# Patient Record
Sex: FEMALE | Race: WHITE | NOT HISPANIC OR LATINO | Employment: FULL TIME | URBAN - METROPOLITAN AREA
[De-identification: names, ages, dates, MRNs, and addresses within clinical notes are randomized per-mention and may not be internally consistent; named-entity substitution may affect disease eponyms.]

---

## 2017-09-19 DIAGNOSIS — F41.8 DEPRESSION WITH ANXIETY: ICD-10-CM

## 2017-09-19 NOTE — TELEPHONE ENCOUNTER
Request for medication refill:    Date of last visit at clinic: 1/18/16    Please complete refill if appropriate and CLOSE ENCOUNTER.    Closing the encounter signifies the refill is complete.    If refill has been denied, please complete the smart phrase .smirefuse and route it to the [facility] RN TRIAGE pool to inform the patient and the pharmacy.    Ingrid Gates RN

## 2017-10-11 ENCOUNTER — OFFICE VISIT (OUTPATIENT)
Dept: FAMILY MEDICINE | Facility: CLINIC | Age: 47
End: 2017-10-11

## 2017-10-11 VITALS
HEART RATE: 72 BPM | BODY MASS INDEX: 32.67 KG/M2 | RESPIRATION RATE: 16 BRPM | WEIGHT: 228.2 LBS | SYSTOLIC BLOOD PRESSURE: 127 MMHG | HEIGHT: 70 IN | TEMPERATURE: 98.9 F | DIASTOLIC BLOOD PRESSURE: 84 MMHG | OXYGEN SATURATION: 98 %

## 2017-10-11 DIAGNOSIS — F17.210 CIGARETTE NICOTINE DEPENDENCE WITHOUT COMPLICATION: ICD-10-CM

## 2017-10-11 DIAGNOSIS — Z00.00 ROUTINE HEALTH MAINTENANCE: Primary | ICD-10-CM

## 2017-10-11 DIAGNOSIS — F41.8 DEPRESSION WITH ANXIETY: ICD-10-CM

## 2017-10-11 ASSESSMENT — ANXIETY QUESTIONNAIRES
1. FEELING NERVOUS, ANXIOUS, OR ON EDGE: SEVERAL DAYS
IF YOU CHECKED OFF ANY PROBLEMS ON THIS QUESTIONNAIRE, HOW DIFFICULT HAVE THESE PROBLEMS MADE IT FOR YOU TO DO YOUR WORK, TAKE CARE OF THINGS AT HOME, OR GET ALONG WITH OTHER PEOPLE: NOT DIFFICULT AT ALL
5. BEING SO RESTLESS THAT IT IS HARD TO SIT STILL: NOT AT ALL
2. NOT BEING ABLE TO STOP OR CONTROL WORRYING: SEVERAL DAYS
7. FEELING AFRAID AS IF SOMETHING AWFUL MIGHT HAPPEN: SEVERAL DAYS
GAD7 TOTAL SCORE: 3
6. BECOMING EASILY ANNOYED OR IRRITABLE: NOT AT ALL
3. WORRYING TOO MUCH ABOUT DIFFERENT THINGS: NOT AT ALL

## 2017-10-11 ASSESSMENT — PATIENT HEALTH QUESTIONNAIRE - PHQ9
5. POOR APPETITE OR OVEREATING: NOT AT ALL
SUM OF ALL RESPONSES TO PHQ QUESTIONS 1-9: 1

## 2017-10-11 NOTE — PATIENT INSTRUCTIONS
Here is the plan from today's visit    1. Routine health maintenance  Tdap today  - ADMIN VACCINE, INITIAL  - TDAP VACCINE (BOOSTRIX)    2. Depression with anxiety  Continue your zoloft at current dose  Start Vit D again for the winter    You are up to date with all health maintenance - reminder on pap next year    Tobacco use - work with your therapist on some strategies for stress reduction  - can start with the Step 2 patches (14mg)  - pick a quit date!!! (and see what happens with your mom - tell her I said it was a good idea)      Please call or return to clinic if your symptoms don't go away.    Follow up plan - as needed    Thank you for coming to [facility] today.  Lab Testing:  **If you had lab testing today and your results are reassuring or normal they will be mailed to you or sent through [employer] within 7 days.   **If the lab tests need quick action we will call you with the results.  The phone number we will call with results is # [phone] (home) . If this is not the best number please call our clinic and change the number.  Medication Refills:  If you need any refills please call your pharmacy and they will contact us.   If you need to  your refill at a new pharmacy, please contact the new pharmacy directly. The new pharmacy will help you get your medications transferred faster.   Scheduling:  If you have any concerns about today's visit or wish to schedule another appointment please call our office during normal business hours [phone] (8-5:00 M-F)  If a referral was made to a [facility] and you don't get a call from central scheduling please call [phone].  If a Mammogram was ordered for you at [facility] call [phone] to schedule or change your appointment.  If you had an XRay/CT/Ultrasound/MRI ordered the number is [phone] to schedule or change your radiology appointment.   Medical Concerns:  If you have urgent medical concerns please  call [phone] at any time of the day.  If you have a medical emergency please call 555.

## 2017-10-11 NOTE — MR AVS SNAPSHOT
After Visit Summary   10/11/2017    Pavithra Aguirre    MRN: [id number]           Patient Information     Date Of Birth          1970        Visit Information        Provider Department      10/11/2017 8:00 AM Tracey Guidry MD [facility]        Today's Diagnoses     Routine health maintenance    -  1    Depression with anxiety          Care Instructions    Here is the plan from today's visit    1. Routine health maintenance  Tdap today  - ADMIN VACCINE, INITIAL  - TDAP VACCINE (BOOSTRIX)    2. Depression with anxiety  Continue your zoloft at current dose  Start Vit D again for the winter    You are up to date with all health maintenance - reminder on pap next year    Tobacco use - work with your therapist on some strategies for stress reduction  - can start with the Step 2 patches (14mg)  - pick a quit date!!! (and see what happens with your mom - tell her I said it was a good idea)      Please call or return to clinic if your symptoms don't go away.    Follow up plan - as needed    Thank you for coming to [facility] today.  Lab Testing:  **If you had lab testing today and your results are reassuring or normal they will be mailed to you or sent through [employer] within 7 days.   **If the lab tests need quick action we will call you with the results.  The phone number we will call with results is # [phone] (home) . If this is not the best number please call our clinic and change the number.  Medication Refills:  If you need any refills please call your pharmacy and they will contact us.   If you need to  your refill at a new pharmacy, please contact the new pharmacy directly. The new pharmacy will help you get your medications transferred faster.   Scheduling:  If you have any concerns about today's visit or wish to schedule another appointment please call our office during normal business hours [phone] (8-5:00 M-F)  If a referral was made to a [facility] and you don't get a call from central scheduling please call [phone].  If a Mammogram was ordered for you at [facility] call [phone] to schedule or change your appointment.  If you had an XRay/CT/Ultrasound/MRI ordered the number is [phone] to schedule or change your radiology appointment.   Medical Concerns:  If you have urgent medical concerns please call [phone] at any time of the day.  If you have a medical emergency please call [phone].            Follow-ups after your visit        Who to contact     Please call your clinic at [phone] to:    Ask questions about your health    Make or cancel appointments    Discuss your medicines    Learn about your test results    Speak to your doctor   If you have compliments or concerns about an experience at your clinic, or if you wish to file a complaint, please contact [facility] Patient Relations at [phone] or email us at [email or web address]         Additional Information About Your Visit        [employer] Information     [employer] gives you secure access to your electronic health record. If you see a primary care provider, you can also send messages to your care team and make appointments. If you have questions, please call your primary care clinic.  If you do not have a primary care provider, please call [phone] and they will assist you.      [employer] is an electronic gateway that provides easy, online access to your medical records. With [employer], you can request a clinic appointment, read your test results, renew a prescription or communicate with your care team.     To access your existing account, please contact your [facility] or call [phone] for assistance.        Care EveryWhere ID     This is your Care EveryWhere ID. This could be used by other organizations to access your [place] medical records  [id number]        Your Vitals Were  "    Pulse Temperature Respirations Height Pulse Oximetry Breastfeeding?    72 98.9  F (37.2  C) (Oral) 16 5' 10\" (177.8 cm) 98% No    BMI (Body Mass Index)                   32.74 kg/m2            Blood Pressure from Last 3 Encounters:   10/11/17 127/84   01/18/16 108/67   10/20/15 117/83    Weight from Last 3 Encounters:   10/11/17 228 lb 3.2 oz (103.5 kg)   01/18/16 248 lb 12.8 oz (112.9 kg)   10/20/15 241 lb 3.2 oz (109.4 kg)              We Performed the Following     ADMIN VACCINE, INITIAL     TDAP VACCINE (BOOSTRIX)          Today's Medication Changes          These changes are accurate as of: 10/11/17  8:43 AM.  If you have any questions, ask your nurse or doctor.               Stop taking these medicines if you haven't already. Please contact your care team if you have questions.     hydrOXYzine 25 MG tablet   Commonly known as:  ATARAX   Stopped by:  Tracey Guidry MD                    Primary Care Provider Office Phone # Fax #    Tracey Guidry [phone]       [address] [address]        Equal Access to Services     CLAUDIO HERNANDEZ : Albert Reyes, deyanirada anne, qanareshta kaalmada leti, say cabral. So [facility] [phone].    ATENCIÓN: Si habla español, tiene a anna disposición servicios gratuitos de asistencia lingüística. Llame al [phone].    We comply with applicable federal civil rights laws and [place] laws. We do not discriminate on the basis of race, color, national origin, age, disability, sex, sexual orientation, or gender identity.            Thank you!     Thank you for choosing [facility]  for your care. Our goal is always to provide you with excellent care. Hearing back from our patients is one way we can continue to improve our services. Please take a few minutes to complete the written survey that you may receive in the mail after your visit with us. Thank you!      "        Your Updated Medication List - Protect others around you: Learn how to safely use, store and throw away your medicines at www.disposemymeds.org.          This list is accurate as of: 10/11/17  8:43 AM.  Always use your most recent med list.                   Brand Name Dispense Instructions for use Diagnosis    sertraline 50 MG tablet    ZOLOFT    90 tablet    Take 1 tablet (50 mg) by mouth daily    Depression with anxiety

## 2017-10-11 NOTE — PROGRESS NOTES
"      HPI:       Pavithra Aguirre is a 47 year old who presents for the following  Patient presents with:  RECHECK: F/U on Zoloft medication       Depression/Anxiety follow up       Status since last visit: No change    What is going well? Buying a house, Got Kittens    Life Stressors:The Move, Money    Other associated symptoms :None    How is your sleep? Having issues falling asleep due to over thinking due to the move. Once sleeping does sleep well    New Significant life event:Yes-  Buying my 1st home [place]in [email or web address] exciting but a little stressful    Current substance abuse: None    Anxiety / Panic symptoms: Yes-  Only due to move.     Recent PHQ-9 Scores:     PHQ-9 SCORE 10/19/2015 1/18/2016 10/11/2017   Total Score - - -   Total Score 2 5 1     Recent PRISCA-7 Scores:      PRISCA-7 SCORE 4/3/2015 1/18/2016 10/11/2017   Total Score 20 - -   Total Score - 1 3       Today' sPHQ 9 Reviewed and it is 1 improving       Adherence and Exercise  Medication side effects: no  How often is a medication missed? Never  Exercise:walking  and biking 4-5 days/week for an average of 15-30 minutes     Still smoking - about 1/2 ppd  - uses a \"stress-reduction\"  - wants to quit, feels like she'll be ready soon  - has previously quit and/or cut down, used OTC patches  - mom smokes, too (trying to get her to quit, so they can work on it together)  - likely will try by New Year's if not sooner (feels like she needs to get through the move first)    No other medical concerns, UTD with HCM    Problem, Medication and Allergy Lists were reviewed and are current.     Patient Active Problem List    Diagnosis Date Noted     Depression with anxiety 01/18/2016     Priority: Medium     Generalized anxiety disorder 02/07/2015     Priority: Medium     Elevated blood pressure 02/07/2015     Priority: Medium     Patient is an established patient of this clinic and I reviewed/updated PMH, SocHx         Review of Systems:   Review of Systems " "  Constitutional: Negative for activity change, appetite change, fatigue, fever and unexpected weight change.   Respiratory: Negative.    Cardiovascular: Negative.    Musculoskeletal: Negative.    Neurological: Negative for dizziness and headaches.   Psychiatric/Behavioral: Negative for dysphoric mood, self-injury, sleep disturbance and suicidal ideas. The patient is nervous/anxious (slightly - but feels normal).              Physical Exam:   Patient Vitals for the past 24 hrs:   BP Temp Temp src Pulse Resp SpO2 Height Weight   10/11/17 0802 127/84 98.9  F (37.2  C) Oral 72 16 98 % 5' 10\" (177.8 cm) 228 lb 3.2 oz (103.5 kg)     Body mass index is 32.74 kg/(m^2).  Vitals were reviewed and were normal    Physical Exam   Constitutional: She is oriented to person, place, and time. She appears well-developed.   Eyes: EOM are normal.   Pulmonary/Chest: Effort normal.   Musculoskeletal: Normal range of motion.   Neurological: She is alert and oriented to person, place, and time.   Psychiatric: She has a normal mood and affect. Her behavior is normal. Judgment and thought content normal.         Results:       Assessment and Plan     Patient Instructions   Here is the plan from today's visit    1. Routine health maintenance  Tdap today  - ADMIN VACCINE, INITIAL  - TDAP VACCINE (BOOSTRIX)    2. Depression with anxiety  Continue your zoloft at current dose  Start Vit D again for the winter    You are up to date with all health maintenance - reminder on pap next year    Tobacco use - work with your therapist on some strategies for stress reduction  - can start with the Step 2 patches (14mg)  - pick a quit date!!! (and see what happens with your mom - tell her I said it was a good idea)  - smoking cessation counseling provided (5 minutes)      Please call or return to clinic if your symptoms don't go away.    Follow up plan - as needed    There are no discontinued medications.  Options for treatment and follow-up care were " reviewed with the patient. Pavithra Aguirre  engaged in the decision making process and verbalized understanding of the options discussed and agreed with the final plan.    Tracey Guidry MD

## 2017-10-12 PROBLEM — F17.210 CIGARETTE NICOTINE DEPENDENCE WITHOUT COMPLICATION: Status: ACTIVE | Noted: 2017-10-12

## 2017-10-12 ASSESSMENT — ENCOUNTER SYMPTOMS
MUSCULOSKELETAL NEGATIVE: 1
ACTIVITY CHANGE: 0
FEVER: 0
CARDIOVASCULAR NEGATIVE: 1
HEADACHES: 0
SLEEP DISTURBANCE: 0
FATIGUE: 0
RESPIRATORY NEGATIVE: 1
UNEXPECTED WEIGHT CHANGE: 0
DIZZINESS: 0
DYSPHORIC MOOD: 0
NERVOUS/ANXIOUS: 1
APPETITE CHANGE: 0

## 2017-10-12 ASSESSMENT — ANXIETY QUESTIONNAIRES: GAD7 TOTAL SCORE: 3

## 2018-01-16 ENCOUNTER — MYC MEDICAL ADVICE (OUTPATIENT)
Dept: FAMILY MEDICINE | Facility: CLINIC | Age: 48
End: 2018-01-16

## 2018-01-16 NOTE — TELEPHONE ENCOUNTER
RN called patient to ask more questions about right shoulder. Had to leave  with name and callback number    Ingrid Gates RN

## 2018-01-16 NOTE — TELEPHONE ENCOUNTER
RN called pt.     Pt stated during the fall she jerked her arm behind her as she held on the the railing. Pt states she can't see any bruising or swelling on right shoulder. Pt states she can't lift right arm above her shoulder or behind her. States it is tender and warm to touch. Pt has been immobilizing it as much as possible and stepan and anthony     RN did recommend being evaluated in clinic. Appt scheduled for 1/17 with Dr. Delvalle at 4pm    Ingrid Gates RN

## 2018-01-17 ENCOUNTER — OFFICE VISIT (OUTPATIENT)
Dept: FAMILY MEDICINE | Facility: CLINIC | Age: 48
End: 2018-01-17
Payer: COMMERCIAL

## 2018-01-17 VITALS
HEIGHT: 70 IN | WEIGHT: 240.8 LBS | HEART RATE: 82 BPM | BODY MASS INDEX: 34.47 KG/M2 | RESPIRATION RATE: 16 BRPM | DIASTOLIC BLOOD PRESSURE: 84 MMHG | OXYGEN SATURATION: 99 % | SYSTOLIC BLOOD PRESSURE: 130 MMHG | TEMPERATURE: 98.7 F

## 2018-01-17 DIAGNOSIS — S46.819A STRAIN OF DELTOID MUSCLE, INITIAL ENCOUNTER: Primary | ICD-10-CM

## 2018-01-17 NOTE — PROGRESS NOTES
"      HPI:       Pavithra Aguirre is a 47 year old who presents for the following  Patient presents with:  Pain: Hurt right shoulder in fall on sat.    Joint/Muscle Pain      Onset: Saturday evening  Injury?  Yes Details: Slipped while walking downstairs        Description:   Location(s): R shoulder  Character: Dull ache    Intensity: severe yesterday, slightly improved today    Progression of Symptoms: better    Accompanying Signs & Symptoms:  Other symptoms: none    History:   Previous similar pain: no      Worsened by    Overuse?: no  Morning Stiffness?:no    Alleviating factors:  Improved by: rest/inactivity, heat and massage  Therapies Tried and outcome: tried topical pain relievers with some help       Problem, Medication and Allergy Lists were reviewed and are current.  Patient is an established patient of this clinic.         Review of Systems:   Review of Systems   Musculoskeletal: Positive for myalgias.             Physical Exam:     Patient Vitals for the past 24 hrs:   BP Temp Temp src Pulse Resp SpO2 Height Weight   01/17/18 1611 130/84 98.7  F (37.1  C) Oral 82 16 99 % 5' 10\" (177.8 cm) 240 lb 12.8 oz (109.2 kg)     Body mass index is 34.55 kg/(m^2).  Vitals were reviewed and were normal     Physical Exam   Constitutional: She is oriented to person, place, and time. She appears well-developed and well-nourished.   HENT:   Head: Normocephalic and atraumatic.   Pulmonary/Chest: Effort normal.   Musculoskeletal:   Active abduction of R shoulder limited to 30 degrees by pain. Full internal rotation, external rotation and forward flexion. No tenderness to palpation of shoulder joint. No weakness appreciated. Negative lift off test.    Neurological: She is alert and oriented to person, place, and time.         Results:      Results from the last 24 hoursNo results found for this or any previous visit (from the past 24 hour(s)).  Assessment and Plan     Pavithra was seen today for pain.    Diagnoses and all " orders for this visit:    Strain of deltoid muscle, initial encounter  Discussed symptomatic treatment at home. Continue ice/heat/NSAIDs. Demonstrated simple ROM exercises to do at home while healing and encouraged movement that she can tolerate without pain (to stop if this worsens pain). RTC if not improved in 2 weeks. Patient agrees.     There are no discontinued medications.  Options for treatment and follow-up care were reviewed with the patient. Pavithra Aguirre  engaged in the decision making process and verbalized understanding of the options discussed and agreed with the final plan.    Lorrie Delvalle, DO

## 2018-01-17 NOTE — NURSING NOTE
I have recommended that this patient have a pap smear but she declines at this time.Beulah Pacheco, CMA

## 2018-01-18 ASSESSMENT — ENCOUNTER SYMPTOMS: MYALGIAS: 1

## 2018-08-30 ENCOUNTER — OFFICE VISIT (OUTPATIENT)
Dept: FAMILY MEDICINE | Facility: CLINIC | Age: 48
End: 2018-08-30
Payer: COMMERCIAL

## 2018-08-30 VITALS
OXYGEN SATURATION: 98 % | TEMPERATURE: 98.3 F | DIASTOLIC BLOOD PRESSURE: 84 MMHG | RESPIRATION RATE: 18 BRPM | HEART RATE: 76 BPM | SYSTOLIC BLOOD PRESSURE: 114 MMHG | BODY MASS INDEX: 36.1 KG/M2 | WEIGHT: 251.6 LBS

## 2018-08-30 DIAGNOSIS — F41.8 DEPRESSION WITH ANXIETY: Primary | ICD-10-CM

## 2018-08-30 RX ORDER — BUPROPION HYDROCHLORIDE 100 MG/1
TABLET, EXTENDED RELEASE ORAL
Qty: 60 TABLET | Refills: 2 | Status: SHIPPED | OUTPATIENT
Start: 2018-08-30 | End: 2018-11-21

## 2018-08-30 ASSESSMENT — ANXIETY QUESTIONNAIRES
3. WORRYING TOO MUCH ABOUT DIFFERENT THINGS: NOT AT ALL
6. BECOMING EASILY ANNOYED OR IRRITABLE: NEARLY EVERY DAY
7. FEELING AFRAID AS IF SOMETHING AWFUL MIGHT HAPPEN: MORE THAN HALF THE DAYS
IF YOU CHECKED OFF ANY PROBLEMS ON THIS QUESTIONNAIRE, HOW DIFFICULT HAVE THESE PROBLEMS MADE IT FOR YOU TO DO YOUR WORK, TAKE CARE OF THINGS AT HOME, OR GET ALONG WITH OTHER PEOPLE: SOMEWHAT DIFFICULT
5. BEING SO RESTLESS THAT IT IS HARD TO SIT STILL: NOT AT ALL
1. FEELING NERVOUS, ANXIOUS, OR ON EDGE: NOT AT ALL
2. NOT BEING ABLE TO STOP OR CONTROL WORRYING: SEVERAL DAYS
GAD7 TOTAL SCORE: 6

## 2018-08-30 ASSESSMENT — ENCOUNTER SYMPTOMS
GASTROINTESTINAL NEGATIVE: 1
APPETITE CHANGE: 0
CARDIOVASCULAR NEGATIVE: 1
MUSCULOSKELETAL NEGATIVE: 1
FATIGUE: 1
ACTIVITY CHANGE: 0
RESPIRATORY NEGATIVE: 1

## 2018-08-30 ASSESSMENT — PATIENT HEALTH QUESTIONNAIRE - PHQ9: 5. POOR APPETITE OR OVEREATING: NOT AT ALL

## 2018-08-30 NOTE — MR AVS SNAPSHOT
After Visit Summary   8/30/2018    Pavithra Aguirre    MRN: [id number]           Patient Information     Date Of Birth          1970        Visit Information        Provider Department      8/30/2018 9:40 AM Tracey Guidry MD [facility]        Today's Diagnoses     Depression with anxiety    -  1      Care Instructions    Here is the plan from today's visit    1. Depression with anxiety  Gentle taper  - buPROPion (WELLBUTRIN SR) 100 MG 12 hr tablet; Take by mouth once daily for 2 weeks, then twice daily  Dispense: 60 tablet; Refill: 2      Please call or return to clinic if your symptoms don't go away.    Follow up plan - by [employer] if needed, in one month in person to follow up    Thank you for coming to [facility] today.  Lab Testing:  **If you had lab testing today and your results are reassuring or normal they will be mailed to you or sent through [employer] within 7 days.   **If the lab tests need quick action we will call you with the results.  The phone number we will call with results is # [phone] (home) . If this is not the best number please call our clinic and change the number.  Medication Refills:  If you need any refills please call your pharmacy and they will contact us.   If you need to  your refill at a new pharmacy, please contact the new pharmacy directly. The new pharmacy will help you get your medications transferred faster.   Scheduling:  If you have any concerns about today's visit or wish to schedule another appointment please call our office during normal business hours [phone] (8-5:00 M-F)  If a referral was made to a [facility] and you don't get a call from central scheduling please call [phone].  If a Mammogram was ordered for you at [facility] call [phone] to schedule or change your appointment.  If you had an XRay/CT/Ultrasound/MRI ordered the number is [phone] to schedule or change  your radiology appointment.   Medical Concerns:  If you have urgent medical concerns please call [phone] at any time of the day.  If you have a medical emergency please call 911.            Follow-ups after your visit        Who to contact     Please call your clinic at [phone] to:    Ask questions about your health    Make or cancel appointments    Discuss your medicines    Learn about your test results    Speak to your doctor            Additional Information About Your Visit        [employer] Information     [employer] gives you secure access to your electronic health record. If you see a primary care provider, you can also send messages to your care team and make appointments. If you have questions, please call your primary care clinic.  If you do not have a primary care provider, please call [phone] and they will assist you.      [employer] is an electronic gateway that provides easy, online access to your medical records. With [employer], you can request a clinic appointment, read your test results, renew a prescription or communicate with your care team.     To access your existing account, please contact your [facility] or call [phone] for assistance.        Care EveryWhere ID     This is your Care EveryWhere ID. This could be used by other organizations to access your [place] medical records  [id number]        Your Vitals Were     Pulse Temperature Respirations Pulse Oximetry Breastfeeding? BMI (Body Mass Index)    76 98.3  F (36.8  C) (Oral) 18 98% No 36.1 kg/m2       Blood Pressure from Last 3 Encounters:   08/30/18 114/84   01/17/18 130/84   10/11/17 127/84    Weight from Last 3 Encounters:   08/30/18 251 lb 9.6 oz (114.1 kg)   01/17/18 240 lb 12.8 oz (109.2 kg)   10/11/17 228 lb 3.2 oz (103.5 kg)              Today, you had the following     No orders found for display         Today's Medication Changes          These changes are accurate as of 8/30/18  5:12 PM.   If you have any questions, ask your nurse or doctor.               Start taking these medicines.        Dose/Directions    buPROPion 100 MG 12 hr tablet   Commonly known as:  WELLBUTRIN SR   Used for:  Depression with anxiety   Started by:  Tracey Guidry MD        Take by mouth once daily for 2 weeks, then twice daily   Quantity:  60 tablet   Refills:  2            Where to get your medicines      These medications were sent to [facility] [id number] [place], [place] [address] [facility] [address] [address]     Phone:  [phone]     buPROPion 100 MG 12 hr tablet                Primary Care Provider Office Phone # Fax #    Tracey Guidry [phone]       [address] [address]        Equal Access to Services     JOVANNA HERNANDEZ : Albert ricardoo Sojazmyne, waaxda luqadaha, qaybta kaalmada adeegyada, say dickerson . So [facility] [place] [phone].    ATENCIÓN: Si habla español, tiene a anna disposición servicios gratuitos de asistencia lingüística. [facility] [phone].    We comply with applicable federal civil rights laws and [place] laws. We do not discriminate on the basis of race, color, national origin, age, disability, sex, sexual orientation, or gender identity.            Thank you!     Thank you for choosing [facility]  for your care. Our goal is always to provide you with excellent care. Hearing back from our patients is one way we can continue to improve our services. Please take a few minutes to complete the written survey that you may receive in the mail after your visit with us. Thank you!             Your Updated Medication List - Protect others around you: Learn how to safely use, store and throw away your medicines at www.disposemymeds.org.          This list is accurate as of 8/30/18  5:12 PM.  Always use your most recent med list.                   Brand Name  Dispense Instructions for use Diagnosis    buPROPion 100 MG 12 hr tablet    WELLBUTRIN SR    60 tablet    Take by mouth once daily for 2 weeks, then twice daily    Depression with anxiety       sertraline 50 MG tablet    ZOLOFT    90 tablet    Take 1 tablet (50 mg) by mouth daily    Depression with anxiety

## 2018-08-30 NOTE — PATIENT INSTRUCTIONS
Here is the plan from today's visit    1. Depression with anxiety  Gentle taper  - buPROPion (WELLBUTRIN SR) 100 MG 12 hr tablet; Take by mouth once daily for 2 weeks, then twice daily  Dispense: 60 tablet; Refill: 2      Please call or return to clinic if your symptoms don't go away.    Follow up plan - by [facility] if needed, in one month in person to follow up    Thank you for coming to [facility] today.  Lab Testing:  **If you had lab testing today and your results are reassuring or normal they will be mailed to you or sent through [employer] within 7 days.   **If the lab tests need quick action we will call you with the results.  The phone number we will call with results is # [phone] (home) . If this is not the best number please call our clinic and change the number.  Medication Refills:  If you need any refills please call your pharmacy and they will contact us.   If you need to  your refill at a new pharmacy, please contact the new pharmacy directly. The new pharmacy will help you get your medications transferred faster.   Scheduling:  If you have any concerns about today's visit or wish to schedule another appointment please call our office during normal business hours [phone] (8-5:00 M-F)  If a referral was made to a [facility] and you don't get a call from central scheduling please call [phone].  If a Mammogram was ordered for you at [facility] call [phone] to schedule or change your appointment.  If you had an XRay/CT/Ultrasound/MRI ordered the number is [phone] to schedule or change your radiology appointment.   Medical Concerns:  If you have urgent medical concerns please call [phone] at any time of the day.  If you have a medical emergency please call [phone].

## 2018-08-30 NOTE — PROGRESS NOTES
"       HPI       Pavithra Aguirre is a 48 year old  who presents for   Chief Complaint   Patient presents with     Depression     anger, several days, increased           Depression/Anxiety follow up      Your mood since your last visit: Worsened, increased anger    Medication? Zoloft     Therapy? Yes, Dr Beatty    Exercise? Inconsistent    What is going well? Blanket, gardening     Life Stressors:Money    Other associated symptoms :Increased sleeping    How is your sleep? Good, but also just feels so fatigued all the time    New significant life event: Yes -  Lay offs at work     Current substance use:  Cannabis    Anxiety / Panic symptoms: No    Quit smoking last Jan, which is great, but that has taken away her \"stress relief\" and is also contributing to wt gain  Hormonal, likely perimenopausal, feels \"fat, bloated, and irritable\"  Knows she needs to exercise more, feels better when she does that    Moved in to a new house last fall  - stressors of home ownership  - lots of work this summer    Has side effects from last attempt at increasing the zoloft (constipation, irritability)     Recent PHQ-9 Scores:     PHQ-9 SCORE 1/18/2016 10/11/2017 8/30/2018   Total Score - - -   Total Score 5 1 8     Recent PRISCA-7 Scores:      PRISCA-7 SCORE 1/18/2016 10/11/2017 8/30/2018   Total Score - - -   Total Score 1 3 6     +++++++    Problem, Medication and Allergy Lists were reviewed and updated if needed..    Patient is an established patient of this clinic..         Review of Systems:   Review of Systems   Constitutional: Positive for fatigue. Negative for activity change and appetite change.   Respiratory: Negative.    Cardiovascular: Negative.    Gastrointestinal: Negative.    Musculoskeletal: Negative.    Psychiatric/Behavioral: Negative for self-injury and suicidal ideas.        See HPI            Physical Exam:     Vitals:    08/30/18 1014   BP: 114/84   Pulse: 76   Resp: 18   Temp: 98.3  F (36.8  C)   TempSrc: Oral   SpO2: " 98%   Weight: 251 lb 9.6 oz (114.1 kg)     Body mass index is 36.1 kg/(m^2).  Vitals were reviewed and were normal     Physical Exam   Constitutional: She is oriented to person, place, and time. She appears well-developed and well-nourished.   Pulmonary/Chest: Effort normal.   Musculoskeletal: Normal range of motion. She exhibits no edema.   Neurological: She is alert and oriented to person, place, and time.     MENTAL STATUS EXAM  Appearance: appropriate  Attitude: cooperative  Behavior: normal  Eye Contact: normal  Speech: normal  Orientation: oriented to person, place, time and situation  Mood: generally ok, though more agitated recently  Affect: Congruent with mood  Thought Process: appropriate  Suicidal Ideation: reports no suicidal ideation  Hallucination: denies      Results:   No testing ordered today    Assessment and Plan        Patient Instructions   Here is the plan from today's visit    1. Depression with anxiety  Gentle taper up on wellbutrin  Close follow up on symptoms  Monitor for s/sx of increased anxiety  Consider dropping the zoloft depending on response with wellbutrin  Continue with therapy  Increase activity (set realistic goals)  - buPROPion (WELLBUTRIN SR) 100 MG 12 hr tablet; Take by mouth once daily for 2 weeks, then twice daily  Dispense: 60 tablet; Refill: 2      [facility], in one month in person to follow up         There are no discontinued medications.    Options for treatment and follow-up care were reviewed with the patient. Pavithra Aguirre  engaged in the decision making process and verbalized understanding of the options discussed and agreed with the final plan.    Tracey Guidry MD

## 2018-08-31 ASSESSMENT — PATIENT HEALTH QUESTIONNAIRE - PHQ9: SUM OF ALL RESPONSES TO PHQ QUESTIONS 1-9: 8

## 2018-08-31 ASSESSMENT — ANXIETY QUESTIONNAIRES: GAD7 TOTAL SCORE: 6

## 2018-10-10 ENCOUNTER — MYC REFILL (OUTPATIENT)
Dept: FAMILY MEDICINE | Facility: CLINIC | Age: 48
End: 2018-10-10

## 2018-10-10 DIAGNOSIS — F41.8 DEPRESSION WITH ANXIETY: ICD-10-CM

## 2018-10-10 NOTE — TELEPHONE ENCOUNTER
Message from [employer]:  Original authorizing provider: Tracey Guidry MD    Pavithra Aguirre would like a refill of the following medications:  sertraline (ZOLOFT) 50 MG tablet [Tracey Guidry MD]    Preferred pharmacy: [facility] [address] [place] [address] AT [address]    Comment:  I am out of refills and only have two pills left.

## 2018-10-10 NOTE — TELEPHONE ENCOUNTER

## 2018-10-15 DIAGNOSIS — F41.8 DEPRESSION WITH ANXIETY: ICD-10-CM

## 2018-10-15 NOTE — TELEPHONE ENCOUNTER

## 2018-11-21 DIAGNOSIS — F41.8 DEPRESSION WITH ANXIETY: ICD-10-CM

## 2018-11-22 RX ORDER — BUPROPION HYDROCHLORIDE 100 MG/1
TABLET, EXTENDED RELEASE ORAL
Qty: 60 TABLET | Refills: 2 | Status: SHIPPED | OUTPATIENT
Start: 2018-11-22 | End: 2019-02-13

## 2019-02-13 DIAGNOSIS — F41.8 DEPRESSION WITH ANXIETY: ICD-10-CM

## 2019-02-13 RX ORDER — BUPROPION HYDROCHLORIDE 100 MG/1
TABLET, EXTENDED RELEASE ORAL
Qty: 60 TABLET | Refills: 2 | Status: SHIPPED | OUTPATIENT
Start: 2019-02-13 | End: 2019-07-12

## 2019-02-13 NOTE — TELEPHONE ENCOUNTER

## 2019-02-14 ENCOUNTER — OFFICE VISIT (OUTPATIENT)
Dept: FAMILY MEDICINE | Facility: CLINIC | Age: 49
End: 2019-02-14
Payer: COMMERCIAL

## 2019-02-14 VITALS
BODY MASS INDEX: 35.87 KG/M2 | HEART RATE: 78 BPM | OXYGEN SATURATION: 98 % | DIASTOLIC BLOOD PRESSURE: 84 MMHG | SYSTOLIC BLOOD PRESSURE: 124 MMHG | TEMPERATURE: 98.7 F | WEIGHT: 250 LBS

## 2019-02-14 DIAGNOSIS — H69.93 DYSFUNCTION OF BOTH EUSTACHIAN TUBES: Primary | ICD-10-CM

## 2019-02-14 RX ORDER — FLUTICASONE PROPIONATE 50 MCG
2 SPRAY, SUSPENSION (ML) NASAL DAILY
Qty: 18.2 ML | Refills: 1 | Status: SHIPPED | OUTPATIENT
Start: 2019-02-14 | End: 2020-02-14

## 2019-02-14 NOTE — PATIENT INSTRUCTIONS
Here is the plan from today's visit    1. Dysfunction of both eustachian tubes  Continue with current plan of neti pot as often as you wish, follow neti pot with flonase (2 sprays each nostril, point towards ear)  Day of flying, use afrin if needed  Call our clinic ([phone]) if you have a fever (temp greater than 100.4F) and we may prescribe you an antibiotic  May use sudafed SPARINGLY if still having ear trouble day of flying  - fluticasone (FLONASE) 50 MCG/ACT nasal spray; Spray 2 sprays into both nostrils daily  Dispense: 18.2 mL; Refill: 1      Please call or return to clinic if your symptoms don't go away.    Follow up plan  Please make a clinic appointment for follow up with your primary physician Tracey Guidry MD in 2 weeks if not improved.    Thank you for coming to [facility] today.  Lab Testing:  **If you had lab testing today and your results are reassuring or normal they will be mailed to you or sent through [employer] within 7 days.   **If the lab tests need quick action we will call you with the results.  The phone number we will call with results is # [phone] (home) . If this is not the best number please call our clinic and change the number.  Medication Refills:  If you need any refills please call your pharmacy and they will contact us.   If you need to  your refill at a new pharmacy, please contact the new pharmacy directly. The new pharmacy will help you get your medications transferred faster.   Scheduling:  If you have any concerns about today's visit or wish to schedule another appointment please call our office during normal business hours [phone] (8-5:00 M-F)  If a referral was made to a [facility] and you don't get a call from central scheduling please call [phone].  If a Mammogram was ordered for you at [facility] call [phone] to schedule or change your appointment.  If you had an XRay/CT/Ultrasound/MRI ordered the number  is [phone] to schedule or change your radiology appointment.   Medical Concerns:  If you have urgent medical concerns please call [phone] at any time of the day.    Karley Monsalve, DO

## 2019-02-14 NOTE — PROGRESS NOTES
Preceptor Attestation:   Patient seen, evaluated and discussed with the resident. I have verified the content of the note, which accurately reflects my assessment of the patient and the plan of care.   Supervising Physician:  Emilie Larry MD

## 2019-02-14 NOTE — PROGRESS NOTES
"       HPI       Pavithra Aguirre is a 48 year old  who presents for   Chief Complaint   Patient presents with     Cough     coughing cleared up and still has sx of ear pain and runny nose     Acute Illness   Concerns: next Tuesday, less than 1 week ago  When did it start? 2 weeks  Is it getting better, worse or staying the same? Primarily in head and nose, then \"dropped to chest\" after 1 week, productive cough, nasal sputum. Started to feel better, then ear \"started to act strange.\" no sore throat any more, no cough, little bit of sinus drainage    Fatigue/Achiness?:No     Fever?: No     Chills/Sweats?: No     Headache (location?) YES slight frontal    Sinus Pressure?:No     Eye redness/Discharge?: No     Ear Pain?:  YES, right side pain and decreased hearing, left side decreased hearing only    Runny nose?:  YES, post nasal drip    Congestion?: No     Sore Throat?: No   Respiratory    Cough?: no     Wheeze?: No   GI/    Decreased Appetite?: No     Nausea?:No     Vomiting?: No     Diarrhea?:  No     Dysuria/Frequency?.:No       Any Illness Exposure?:  YES, co workers with colds    Any foreign travel or contact with anyone ill who travelled abroad? No     Therapies Tried and outcome: neti pot, emergen c daily, zinc/echinacea losange, afrin tried for a few days worked on nose  History of swimmer's ear - since as a kid  - usually do alcohol in ear and drain   - less swimming than usual   +++++++    Problem, Medication and Allergy Lists were reviewed and updated if needed..    Patient is an established patient of this clinic..         Review of Systems:   Review of Systems         Physical Exam:     Vitals:    02/14/19 1541   BP: 124/84   Pulse: 78   Temp: 98.7  F (37.1  C)   TempSrc: Oral   SpO2: 98%   Weight: 113.4 kg (250 lb)     Body mass index is 35.87 kg/m .  Vitals were reviewed and were normal     Physical Exam   Constitutional: She appears well-developed and well-nourished. No distress.   HENT:   Right Ear: " External ear normal.   Left Ear: External ear normal.   Mouth/Throat: Oropharynx is clear and moist. No oropharyngeal exudate.   Bilateral TM's normal   Eyes: Conjunctivae are normal. Pupils are equal, round, and reactive to light. Right eye exhibits no discharge. Left eye exhibits no discharge.   Cardiovascular: Normal rate and regular rhythm.   Pulmonary/Chest: Effort normal and breath sounds normal. No stridor. No respiratory distress. She has no wheezes. She has no rales.   Neurological: She is alert.   Skin: She is not diaphoretic.   Psychiatric: She has a normal mood and affect. Her behavior is normal. Judgment and thought content normal.   Nursing note and vitals reviewed.      Results:   No testing ordered today    Assessment and Plan        1. Dysfunction of both eustachian tubes  No signs of bacterial infection on exam. Pt's symptoms consistent with recent viral URI, and now with some nasal edema causing sensation of fullness in bilateral ears. Discussed with patient about symptomatic treatment - continuing nasal rinses, then using flonase to decrease inflammation in nasal turbinates causing blockage of the eustachian tubes. Also suggested afrin PRN x 3 days maximum, but pt declined wanting to use afrin. If patient develops a fever, instructed to call right away for antibiotics, but no signs of sinusitis today to suggest need of abx now.  - fluticasone (FLONASE) 50 MCG/ACT nasal spray; Spray 2 sprays into both nostrils daily  Dispense: 18.2 mL; Refill: 1       There are no discontinued medications.    Options for treatment and follow-up care were reviewed with the patient. Pavithra Aguirre  engaged in the decision making process and verbalized understanding of the options discussed and agreed with the final plan.    Karley Monsalve,

## 2019-07-11 DIAGNOSIS — F41.8 DEPRESSION WITH ANXIETY: ICD-10-CM

## 2019-07-11 NOTE — TELEPHONE ENCOUNTER
"Request for medication refill:buPROPion (WELLBUTRIN SR) 100 MG 12 hr tablet    Providers if patient needs an appointment and you are willing to give a one month supply please refill for one month and  send a letter/MyChart using \".SMILLIMITEDREFILL\" .smillimited and route chart to \"P [facility] \" (Giving one month refill in non controlled medications is strongly recommended before denial)    If refill has been denied, meaning absolutely no refills without visit, please complete the smart phrase \".smirxrefuse\" and route it to the \"P [facility] MED REFILLS\"  pool to inform the patient and the pharmacy.    Law Na, MA        "

## 2019-07-12 RX ORDER — BUPROPION HYDROCHLORIDE 100 MG/1
TABLET, EXTENDED RELEASE ORAL
Qty: 60 TABLET | Refills: 2 | Status: SHIPPED | OUTPATIENT
Start: 2019-07-12 | End: 2019-12-23

## 2019-08-08 ENCOUNTER — OFFICE VISIT (OUTPATIENT)
Dept: FAMILY MEDICINE | Facility: CLINIC | Age: 49
End: 2019-08-08
Payer: COMMERCIAL

## 2019-08-08 VITALS
HEIGHT: 70 IN | HEART RATE: 63 BPM | BODY MASS INDEX: 36.59 KG/M2 | OXYGEN SATURATION: 99 % | RESPIRATION RATE: 18 BRPM | WEIGHT: 255.6 LBS | SYSTOLIC BLOOD PRESSURE: 113 MMHG | TEMPERATURE: 98.8 F | DIASTOLIC BLOOD PRESSURE: 77 MMHG

## 2019-08-08 DIAGNOSIS — M22.2X2 PATELLOFEMORAL SYNDROME OF LEFT KNEE: Primary | ICD-10-CM

## 2019-08-08 DIAGNOSIS — M67.40 GANGLION CYST: ICD-10-CM

## 2019-08-08 ASSESSMENT — ENCOUNTER SYMPTOMS
CHILLS: 0
SHORTNESS OF BREATH: 0
FEVER: 0

## 2019-08-08 ASSESSMENT — PAIN SCALES - GENERAL: PAINLEVEL: SEVERE PAIN (6)

## 2019-08-08 ASSESSMENT — MIFFLIN-ST. JEOR: SCORE: 1865.89

## 2019-08-08 NOTE — PROGRESS NOTES
"       HPI       Pavithra Aguirre is a 49 year old with history of anxiety and depression who presents for   Chief Complaint   Patient presents with     Musculoskeletal Problem     patient is complaing of something is growing from right foot, causing pain and knee pain      Left Knee pain  No event that she remembers.   Swells with exercise and use. Worse in the AM. No event.   No catching or locking  History of right knee surgery for meniscus tear  Tried icing.     Right foot nodule:   Started/last noticed last week   Thinks it grew when she was on her menstrual cycle   Not painful now although it was painful Saturday when it was around \"half an inch\" larger than it is now. No rash. Not itching. Not on left foot. Never had this happen again. No history of ganglion cyst on wrists.     Mood is good today.     Problem, Medication and Allergy Lists were reviewed and updated if needed..    Patient is an established patient of this clinic.  Past Medical History:   Diagnosis Date     Depressive disorder      Fibroadenoma of left breast     s/p lumpectomy          Review of Systems:   Review of Systems   Constitutional: Negative for chills and fever.   Respiratory: Negative for shortness of breath.    Musculoskeletal: Arthralgias: knee pain.          Physical Exam:     Vitals:    08/08/19 1612   BP: 113/77   Pulse: 63   Resp: 18   Temp: 98.8  F (37.1  C)   TempSrc: Oral   SpO2: 99%   Weight: 115.9 kg (255 lb 9.6 oz)   Height: 1.78 m (5' 10.08\")     Body mass index is 36.59 kg/m .  Vitals were reviewed and were normal     Physical Exam   Constitutional: She is oriented to person, place, and time. She appears well-developed and well-nourished. No distress.   HENT:   Head: Normocephalic and atraumatic.   Eyes: Conjunctivae are normal. No scleral icterus.   Neck: Neck supple.   Cardiovascular: Intact distal pulses.   Pulmonary/Chest: Effort normal. No respiratory distress.   Musculoskeletal: Normal range of motion.        Right " knee: She exhibits swelling (very mild). She exhibits normal range of motion, no effusion, normal patellar mobility and no bony tenderness. No tenderness found.        Left knee: She exhibits effusion (mild) and abnormal meniscus (positive thessaly's ). She exhibits normal range of motion, no LCL laxity, normal patellar mobility, no bony tenderness and no MCL laxity. No tenderness found. No medial joint line, no lateral joint line, no MCL, no LCL and no patellar tendon tenderness noted.        Legs:       Feet:    Neurological: She is alert and oriented to person, place, and time.   Skin: Skin is warm and dry. She is not diaphoretic.   Psychiatric: She has a normal mood and affect. Her behavior is normal.   Vitals reviewed.    Right Knee Exam     Other   Effusion: no effusion present      Left Knee Exam     Muscle Strength   The patient has normal left knee strength.    Tenderness   The patient is experiencing no tenderness.     Range of Motion   The patient has normal left knee ROM.    Tests   Joellen:  Medial - negative Lateral - negative  Varus: negative Valgus: negative  Lachman:  Anterior - negative    Posterior - negative  Drawer:  Anterior - negative     Posterior - negative  Patellar apprehension: negative    Other   Erythema: absent  Scars: absent  Sensation: normal  Pulse: present  Swelling: mild  Effusion: effusion (mild) present    Comments:  Positive thessaly test with external rotation             Results:   No testing ordered today    Assessment and Plan    Pavithra was seen today for left knee pain along with right foot nodule. Foot nodule initial differential limited to infectious (doesn't appear infected), lipoma (not freely movable, not same consistency), cyst- possibly ganglion cyst as near navicular bone and is somewhat similar to wrist. Left knee pain differential includes PFS with mild swelling prepatellar bursa area as pain is worse with activity, prepatellar bursitis, patellar dislocation,  meniscal tear (does have positive thessaly), quadriceps tendinopathy.     Diagnoses and all orders for this visit:    Patellofemoral syndrome of left knee  Ibuprofen 1-2 tablets every 6 hours as needed for pain. Ice packs every 20 minutes on and 20 mins off as needed for pain and swelling. Physical therapy referral today. If pain continues with initial interventions, will need XR and possibly MRI to evaluate for other etiology. Follow up in 3-4 weeks preferably after physical therapy    -     [facility] REFERR[facility] - [facility]; Future    Ganglion cyst  Right foot nodule appears cystic in nature (nonmobile, non tender to palpation, not infectious appearing). Will advise patient watch the lesion (it is already smaller than it was earlier this week). Discussed various treatments including aspiration of cyst or surgery if becomes painful or troublesome.      There are no discontinued medications.    Options for treatment and follow-up care were reviewed with the patient. Pavithra Aguirre  engaged in the decision making process and verbalized understanding of the options discussed and agreed with the final plan.    Jermaine Crisostomo MD

## 2019-08-08 NOTE — PROGRESS NOTES
Preceptor Attestation:   Patient seen, evaluated and discussed with the resident. I have verified the content of the note, which accurately reflects my assessment of the patient and the plan of care.   Supervising Physician:  Cipriano Rutledge MD

## 2019-08-08 NOTE — PATIENT INSTRUCTIONS
Here is the plan from today's visit    1. Patellofemoral syndrome of left knee  Use ibuprofen 1-2 tablets every 6 hours as needed for pain. Ice packs every 20 minutes on and 20 mins off as needed for pain and swelling. Physical therapy is key.    - [facility] REFERRAL - [facility]; Future    Please call or return to clinic if your symptoms don't go away.    Thank you for coming to [facility] today.  Lab Testing:  **If you had lab testing today and your results are reassuring or normal they will be mailed to you or sent through [employer] within 7 days.   **If the lab tests need quick action we will call you with the results.  The phone number we will call with results is # [phone] (home) . If this is not the best number please call our clinic and change the number.  Medication Refills:  If you need any refills please call your pharmacy and they will contact us.   If you need to  your refill at a new pharmacy, please contact the new pharmacy directly. The new pharmacy will help you get your medications transferred faster.   Scheduling:  If you have any concerns about today's visit or wish to schedule another appointment please call our office during normal business hours [phone] (8-5:00 M-F)  If a referral was made to a [facility] and you don't get a call from central scheduling please call [phone].  If a Mammogram was ordered for you at [facility] call [phone] to schedule or change your appointment.  If you had an XRay/CT/Ultrasound/MRI ordered the number is [phone] to schedule or change your radiology appointment.   Medical Concerns:  If you have urgent medical concerns please call [phone] at any time of the day.    Jermaine Crisostomo MD

## 2019-08-12 ENCOUNTER — THERAPY VISIT (OUTPATIENT)
Dept: PHYSICAL THERAPY | Facility: CLINIC | Age: 49
End: 2019-08-12
Payer: COMMERCIAL

## 2019-08-12 DIAGNOSIS — M22.2X2 PATELLOFEMORAL SYNDROME OF LEFT KNEE: ICD-10-CM

## 2019-08-12 DIAGNOSIS — M25.562 KNEE PAIN, LEFT: ICD-10-CM

## 2019-08-12 PROCEDURE — 97161 PT EVAL LOW COMPLEX 20 MIN: CPT | Mod: GP | Performed by: PHYSICAL THERAPIST

## 2019-08-12 PROCEDURE — 97110 THERAPEUTIC EXERCISES: CPT | Mod: GP | Performed by: PHYSICAL THERAPIST

## 2019-08-12 ASSESSMENT — ACTIVITIES OF DAILY LIVING (ADL)
GO UP STAIRS: ACTIVITY IS FAIRLY DIFFICULT
HOW_WOULD_YOU_RATE_THE_OVERALL_FUNCTION_OF_YOUR_KNEE_DURING_YOUR_USUAL_DAILY_ACTIVITIES?: ABNORMAL
SQUAT: I AM UNABLE TO DO THE ACTIVITY
PAIN: THE SYMPTOM AFFECTS MY ACTIVITY MODERATELY
GIVING WAY, BUCKLING OR SHIFTING OF KNEE: THE SYMPTOM AFFECTS MY ACTIVITY SLIGHTLY
KNEE_ACTIVITY_OF_DAILY_LIVING_SCORE: 44.29
RISE FROM A CHAIR: ACTIVITY IS SOMEWHAT DIFFICULT
SIT WITH YOUR KNEE BENT: ACTIVITY IS FAIRLY DIFFICULT
WALK: ACTIVITY IS SOMEWHAT DIFFICULT
RAW_SCORE: 31
HOW_WOULD_YOU_RATE_THE_CURRENT_FUNCTION_OF_YOUR_KNEE_DURING_YOUR_USUAL_DAILY_ACTIVITIES_ON_A_SCALE_FROM_0_TO_100_WITH_100_BEING_YOUR_LEVEL_OF_KNEE_FUNCTION_PRIOR_TO_YOUR_INJURY_AND_0_BEING_THE_INABILITY_TO_PERFORM_ANY_OF_YOUR_USUAL_DAILY_ACTIVITIES?: 40
GO DOWN STAIRS: ACTIVITY IS FAIRLY DIFFICULT
LIMPING: THE SYMPTOM AFFECTS MY ACTIVITY SLIGHTLY
AS_A_RESULT_OF_YOUR_KNEE_INJURY,_HOW_WOULD_YOU_RATE_YOUR_CURRENT_LEVEL_OF_DAILY_ACTIVITY?: ABNORMAL
WEAKNESS: THE SYMPTOM AFFECTS MY ACTIVITY SLIGHTLY
KNEE_ACTIVITY_OF_DAILY_LIVING_SUM: 31
SWELLING: THE SYMPTOM AFFECTS MY ACTIVITY MODERATELY
STAND: ACTIVITY IS SOMEWHAT DIFFICULT
KNEEL ON THE FRONT OF YOUR KNEE: ACTIVITY IS VERY DIFFICULT
STIFFNESS: THE SYMPTOM AFFECTS MY ACTIVITY MODERATELY

## 2019-08-12 NOTE — PROGRESS NOTES
[facility] Initial Evaluation    Subjective:  Pavithra Aguirre is a 49 year old female with a left knee condition. Symptoms commenced as a result of: unknown cause. Condition occurred in the following environment: unknown cause. Onset of symptoms: April 2019. Location of symptoms: lateral, posterior. Pain level on number scale: 4-8/10. Quality of pain: ache, warm, burning. Associated symptoms: stiffness, swelling, giving way, weakness, limping. Pain frequency (constant/intermittent): constant. Pain worse (day/night/same all the time/AM/PM): AM. Symptoms are exacerbated by: walking, stairs, standing, kneeling, squatting, sitting, rising from chair. Symptoms are relieved by: ice, ibuprofen. Progression of symptoms since onset (same/better/worse): worse. Special tests (x-ray, MRI, CT scan, EMG, bone scan): None. Previous treatment: chiropractic. Improvement with previous treatment: temporary improvement. General health as reported by patient is fair. Pertinent medical history includes:  See Epic. Medical allergies:  see Epic. Other pertinent surgeries: early 1990's right knee arthroscopy and debridement; see Epic. Current medications: See Epic. Occupation: . Patient is (working in normal job without restrictions/working in normal job with restrictions/working in an alternate job/not working due to present treatment problem): working in normal job without restrictions. Primary job tasks: computer work, prolonged sitting, repetitive tasks. Barriers at home/work:  None reported by patient. Red flags:  None reported by patient.    Objective  Gait:  Decreased left terminal knee extension    Knee AROM (* = pain) Right Left    107   EXT Lacking 13 Lacking 11   Hyperextension       Knee Strength (* = pain) Right Left   FL 5/5 4/5   EXT 5/5 4/5   Quad Contraction (Good/Fair/Poor) fair fair   Hip ABD NT/5 NT/5     Palpation Tenderness:  Lateral and superior left PFJ, left knee  lateral joint line      Assessment/Plan:    Patient is a 49 year old female with left side knee complaints.    Patient has the following significant findings with corresponding treatment plan.                Diagnosis 1:  Left knee pain  Pain -  manual therapy, splint/taping/bracing/orthotics, self management, education, directional preference exercise and home program  Decreased ROM/flexibility - manual therapy and therapeutic exercise  Decreased joint mobility - manual therapy and therapeutic exercise  Decreased strength - therapeutic exercise and therapeutic activities  Impaired balance - neuro re-education and therapeutic activities  Decreased proprioception - neuro re-education and therapeutic activities  Impaired gait - gait training  Impaired muscle performance - neuro re-education  Decreased function - therapeutic activities    Therapy Evaluation Codes:   1) History comprised of:   Personal factors that impact the plan of care:      None.    Comorbidity factors that impact the plan of care are:      Depression and Overweight.     Medications impacting care: None.  2) Examination of Body Systems comprised of:   Body structures and functions that impact the plan of care:      Knee.   Activity limitations that impact the plan of care are:      Bending, Driving, Jumping, Lifting, Running, Sitting, Sports, Squatting/kneeling, Stairs, Standing and Walking.  3) Clinical presentation characteristics are:   Stable/Uncomplicated.  4) Decision-Making    Low complexity using standardized patient assessment instrument and/or measureable assessment of functional outcome.  Cumulative Therapy Evaluation is: Low complexity.    Previous and current functional limitations:  (See Goal Flow Sheet for this information)    Short term and Long term goals: (See Goal Flow Sheet for this information)     Communication ability:  Patient appears to be able to clearly communicate and understand verbal and written communication and follow  directions correctly.  Treatment Explanation - The following has been discussed with the patient:   RX ordered/plan of care  Anticipated outcomes  Possible risks and side effects  This patient would benefit from PT intervention to resume normal activities.   Rehab potential is good.    Frequency:  1 X week, once daily  Duration:  for 4 weeks tapering to 2 X a month over 1 month  Discharge Plan:  Achieve all LTG.  Independent in home treatment program.  Reach maximal therapeutic benefit.    Please refer to the daily flowsheet for treatment today, total treatment time and time spent performing 1:1 timed codes.

## 2019-09-03 ENCOUNTER — OFFICE VISIT (OUTPATIENT)
Dept: FAMILY MEDICINE | Facility: CLINIC | Age: 49
End: 2019-09-03
Payer: COMMERCIAL

## 2019-09-03 VITALS
SYSTOLIC BLOOD PRESSURE: 112 MMHG | HEART RATE: 117 BPM | HEIGHT: 71 IN | TEMPERATURE: 99.6 F | OXYGEN SATURATION: 97 % | DIASTOLIC BLOOD PRESSURE: 78 MMHG | RESPIRATION RATE: 16 BRPM | BODY MASS INDEX: 35.78 KG/M2 | WEIGHT: 255.6 LBS

## 2019-09-03 DIAGNOSIS — F41.8 DEPRESSION WITH ANXIETY: ICD-10-CM

## 2019-09-03 DIAGNOSIS — L72.0 EPIDERMAL INCLUSION CYST: Primary | ICD-10-CM

## 2019-09-03 DIAGNOSIS — Z97.5 IUD (INTRAUTERINE DEVICE) IN PLACE: ICD-10-CM

## 2019-09-03 ASSESSMENT — MIFFLIN-ST. JEOR: SCORE: 1872.58

## 2019-09-03 ASSESSMENT — PAIN SCALES - GENERAL: PAINLEVEL: MODERATE PAIN (5)

## 2019-09-03 NOTE — PROGRESS NOTES
"       HPI       Pavithra Aguirre is a 49 year old  who presents for   Chief Complaint   Patient presents with     Mass     patient is complaining of bump between her right leg is painful, check medication and IUD removal        R side skin lesion  - tender at times, located along the underwear line  - no pus/drainage  - no redness currently  - unclear if it's been there awhile    Mood  - good  - new partner, things are going well; partner has children, this is new, but a good thing  - stress levels are manageable  - medication has definitely helped  - trying to stay active, eat healthy    IUD  - same sex partner, doesn't need for contraception  - interested in potentially removing the IUD eventually  - has a paragard (placed about 10 years ago)      Problem, Medication and Allergy Lists were reviewed and updated if needed..    Patient is an established patient of this clinic..         Review of Systems:   Review of Systems   Constitutional: Negative for activity change and appetite change.   Genitourinary: Negative.    Skin:        See HPI   Psychiatric/Behavioral: Negative.             Physical Exam:     Vitals:    09/03/19 1624   BP: 112/78   Pulse: 117   Resp: 16   Temp: 99.6  F (37.6  C)   TempSrc: Oral   SpO2: 97%   Weight: 115.9 kg (255 lb 9.6 oz)   Height: 1.791 m (5' 10.5\")     Body mass index is 36.16 kg/m .  Vitals were reviewed and were normal     Physical Exam   Constitutional: She is oriented to person, place, and time. She appears well-developed and well-nourished.   HENT:   Head: Normocephalic and atraumatic.   Pulmonary/Chest: Effort normal.   Genitourinary:   Genitourinary Comments: Small, non-tender, non-inflamed inclusion/follicular cysts in the R groin along the underwear line.  No drainage.   Neurological: She is alert and oriented to person, place, and time.     MENTAL STATUS EXAM  Appearance: appropriate  Attitude: cooperative  Behavior: normal  Eye Contact: normal  Speech: normal  Orientation: " oriented to person, place, time and situation  Mood: normal  Affect: Congruent with mood  Thought Process: appropriate  Suicidal Ideation: reports no suicidal ideation      Results:   No testing ordered today    Assessment and Plan        1. IUD (intrauterine device) in place  Ok to remove whenever she would like it out (but not due for removal for a few years)    2. Epidermal inclusion cyst  Reassurance  If bothersome, could refer to procedure clinic for cyst removal    3. Depression with anxiety  Well-controlled         There are no discontinued medications.    Options for treatment and follow-up care were reviewed with the patient. Pavithra Aguirre  engaged in the decision making process and verbalized understanding of the options discussed and agreed with the final plan.    Tracey Guidry MD

## 2019-09-18 PROBLEM — Z97.5 IUD (INTRAUTERINE DEVICE) IN PLACE: Status: ACTIVE | Noted: 2019-09-18

## 2019-09-18 ASSESSMENT — ENCOUNTER SYMPTOMS
ROS SKIN COMMENTS: SEE HPI
APPETITE CHANGE: 0
ACTIVITY CHANGE: 0
PSYCHIATRIC NEGATIVE: 1

## 2019-09-18 NOTE — PATIENT INSTRUCTIONS
Here is the plan from today's visit    1. IUD (intrauterine device) in place  Ok to remove whenever she would like it out (but not due for removal for a few years)    2. Epidermal inclusion cyst  Reassurance  If bothersome, could refer to procedure clinic for cyst removal    3. Depression with anxiety  Well-controlled      Please call or return to clinic if your symptoms don't go away.    Follow up plan - as needed, and/or for IUD removal    Thank you for coming to [facility] today.  Lab Testing:  **If you had lab testing today and your results are reassuring or normal they will be mailed to you or sent through [employer] within 7 days.   **If the lab tests need quick action we will call you with the results.  The phone number we will call with results is # [phone] (home) . If this is not the best number please call our clinic and change the number.  Medication Refills:  If you need any refills please call your pharmacy and they will contact us.   If you need to  your refill at a new pharmacy, please contact the new pharmacy directly. The new pharmacy will help you get your medications transferred faster.   Scheduling:  If you have any concerns about today's visit or wish to schedule another appointment please call our office during normal business hours [phone] (8-5:00 M-F)  If a referral was made to a [facility] and you don't get a call from central scheduling please call [phone].  If a Mammogram was ordered for you at [facility] call [phone] to schedule or change your appointment.  If you had an XRay/CT/Ultrasound/MRI ordered the number is [phone] to schedule or change your radiology appointment.   Medical Concerns:  If you have urgent medical concerns please call [phone] at any time of the day.  If you have a medical emergency please call [phone].

## 2019-11-04 PROBLEM — M25.562 KNEE PAIN, LEFT: Status: RESOLVED | Noted: 2019-08-12 | Resolved: 2019-11-04

## 2019-12-19 DIAGNOSIS — F41.8 DEPRESSION WITH ANXIETY: ICD-10-CM

## 2019-12-19 NOTE — TELEPHONE ENCOUNTER
"Request for medication refill: sertraline (ZOLOFT) 50 MG tablet    Providers if patient needs an appointment and you are willing to give a one month supply please refill for one month and  send a letter/MyChart using \".SMILLIMITEDREFILL\" .smillimited and route chart to \"P [facility] \" (Giving one month refill in non controlled medications is strongly recommended before denial)    If refill has been denied, meaning absolutely no refills without visit, please complete the smart phrase \".smirxrefuse\" and route it to the \"P [facility] MED REFILLS\"  pool to inform the patient and the pharmacy.    Allyson Hatfield CMA        "

## 2019-12-19 NOTE — TELEPHONE ENCOUNTER
"Request for medication refill: buPROPion (WELLBUTRIN SR) 100 MG 12 hr tablet    Providers if patient needs an appointment and you are willing to give a one month supply please refill for one month and  send a letter/MyChart using \".SMILLIMITEDREFILL\" .smillimited and route chart to \"[facility] \" (Giving one month refill in non controlled medications is strongly recommended before denial)    If refill has been denied, meaning absolutely no refills without visit, please complete the smart phrase \".smirxrefuse\" and route it to the \"[facility] MED REFILLS\"  pool to inform the patient and the pharmacy.    Starr Centeno, [facility]        "

## 2019-12-23 RX ORDER — BUPROPION HYDROCHLORIDE 100 MG/1
100 TABLET, EXTENDED RELEASE ORAL 2 TIMES DAILY
Qty: 180 TABLET | Refills: 3 | Status: SHIPPED | OUTPATIENT
Start: 2019-12-23 | End: 2021-03-03

## 2020-03-10 ENCOUNTER — HEALTH MAINTENANCE LETTER (OUTPATIENT)
Age: 50
End: 2020-03-10

## 2020-09-22 PROBLEM — F17.210 CIGARETTE NICOTINE DEPENDENCE WITHOUT COMPLICATION: Status: RESOLVED | Noted: 2017-10-12 | Resolved: 2020-09-22

## 2020-09-23 ENCOUNTER — OFFICE VISIT (OUTPATIENT)
Dept: FAMILY MEDICINE | Facility: CLINIC | Age: 50
End: 2020-09-23
Payer: COMMERCIAL

## 2020-09-23 VITALS
TEMPERATURE: 98.9 F | SYSTOLIC BLOOD PRESSURE: 114 MMHG | WEIGHT: 247 LBS | DIASTOLIC BLOOD PRESSURE: 80 MMHG | HEART RATE: 78 BPM | HEIGHT: 70 IN | BODY MASS INDEX: 35.36 KG/M2 | OXYGEN SATURATION: 98 %

## 2020-09-23 DIAGNOSIS — Z30.432 ENCOUNTER FOR IUD REMOVAL: ICD-10-CM

## 2020-09-23 DIAGNOSIS — Z23 NEED FOR PROPHYLACTIC VACCINATION AND INOCULATION AGAINST INFLUENZA: Primary | ICD-10-CM

## 2020-09-23 DIAGNOSIS — Z13.220 SCREENING FOR LIPID DISORDERS: ICD-10-CM

## 2020-09-23 DIAGNOSIS — Z12.11 SCREENING FOR COLON CANCER: ICD-10-CM

## 2020-09-23 DIAGNOSIS — Z12.31 VISIT FOR SCREENING MAMMOGRAM: ICD-10-CM

## 2020-09-23 DIAGNOSIS — Z00.00 ROUTINE GENERAL MEDICAL EXAMINATION AT A HEALTH CARE FACILITY: ICD-10-CM

## 2020-09-23 DIAGNOSIS — Z12.4 SCREENING FOR CERVICAL CANCER: ICD-10-CM

## 2020-09-23 LAB
CHOLEST SERPL-MCNC: 237 MG/DL (ref 0–200)
CHOLEST/HDLC SERPL: 6 {RATIO} (ref 0–5)
HDLC SERPL-MCNC: 39.8 MG/DL
LDLC SERPL CALC-MCNC: 134 MG/DL (ref 0–129)
TRIGL SERPL-MCNC: 314.1 MG/DL (ref 0–150)
VLDL CHOLESTEROL: 62.8 MG/DL (ref 7–32)

## 2020-09-23 ASSESSMENT — ANXIETY QUESTIONNAIRES
5. BEING SO RESTLESS THAT IT IS HARD TO SIT STILL: NOT AT ALL
1. FEELING NERVOUS, ANXIOUS, OR ON EDGE: NOT AT ALL
3. WORRYING TOO MUCH ABOUT DIFFERENT THINGS: NOT AT ALL
6. BECOMING EASILY ANNOYED OR IRRITABLE: NOT AT ALL
7. FEELING AFRAID AS IF SOMETHING AWFUL MIGHT HAPPEN: NOT AT ALL
2. NOT BEING ABLE TO STOP OR CONTROL WORRYING: SEVERAL DAYS
IF YOU CHECKED OFF ANY PROBLEMS ON THIS QUESTIONNAIRE, HOW DIFFICULT HAVE THESE PROBLEMS MADE IT FOR YOU TO DO YOUR WORK, TAKE CARE OF THINGS AT HOME, OR GET ALONG WITH OTHER PEOPLE: NOT DIFFICULT AT ALL
GAD7 TOTAL SCORE: 1

## 2020-09-23 ASSESSMENT — MIFFLIN-ST. JEOR: SCORE: 1820.63

## 2020-09-23 ASSESSMENT — PATIENT HEALTH QUESTIONNAIRE - PHQ9
5. POOR APPETITE OR OVEREATING: NOT AT ALL
SUM OF ALL RESPONSES TO PHQ QUESTIONS 1-9: 1

## 2020-09-23 NOTE — PATIENT INSTRUCTIONS

## 2020-09-23 NOTE — PROGRESS NOTES
Female Physical Note          HPI         Concerns today: No concerns, overall doing quite well, happy relationship, partner's 2 kids live with them part time (shared custody with their other mother), everyone is doing ok during COVID    No medical concerns, mood is stable  Wants IUD out today, currently in same-sex partnered relationship    Patient Active Problem List   Diagnosis     Elevated blood pressure     Depression with anxiety     IUD (intrauterine device) in place       Past Medical History:   Diagnosis Date     Depressive disorder      Fibroadenoma of left breast     s/p lumpectomy        Family History   Problem Relation Age of Onset     Depression/Anxiety Mother      Diabetes Father      Chemical Addiction Father      Depression/Anxiety Father      Diabetes Maternal Grandmother      Hypertension Maternal Grandmother      Diabetes Maternal Grandfather      Hypertension Maternal Grandfather      Breast Cancer Paternal Grandmother               Review of Systems:     Review of Systems:  CONSTITUTIONAL: NEGATIVE for fever, chills, change in weight  INTEGUMENTARY/SKIN: NEGATIVE for worrisome rashes, moles or lesions  EYES: NEGATIVE for vision changes or irritation  ENT/MOUTH: NEGATIVE for ear, mouth and throat problems  RESP: NEGATIVE for significant cough or SOB  BREAST: NEGATIVE for masses, tenderness or discharge  CV: NEGATIVE for chest pain, palpitations or peripheral edema  GI: NEGATIVE for nausea, abdominal pain, heartburn, or change in bowel habits  : NEGATIVE for frequency, dysuria, or hematuria  MUSCULOSKELETAL: NEGATIVE for significant arthralgias or myalgia  NEURO: NEGATIVE for weakness, dizziness or paresthesias  ENDOCRINE: NEGATIVE for temperature intolerance, skin/hair changes  HEME/ALLERGY: NEGATIVE for bleeding problems  PSYCHIATRIC: NEGATIVE for changes in mood or affect  Sleep:   Do you snore most or the night (as reported by a family member)? No  Do you feel sleepy or extremely tired  during most of the day? No           Social History     Social History     Socioeconomic History     Marital status: Single     Spouse name: Not on file     Number of children: Not on file     Years of education: Not on file     Highest education level: Not on file   Occupational History     Not on file   Social Needs     Financial resource strain: Not on file     Food insecurity     Worry: Not on file     Inability: Not on file     Transportation needs     Medical: Not on file     Non-medical: Not on file   Tobacco Use     Smoking status: Former Smoker     Packs/day: 0.25     Types: Cigarettes     Start date: 7/15/2015     Smokeless tobacco: Never Used   Substance and Sexual Activity     Alcohol use: Not Currently     Alcohol/week: 0.0 standard drinks     Comment: ocassional     Drug use: Yes     Types: Marijuana     Comment: every once in awhile marijuana use.     Sexual activity: Yes     Partners: Female     Birth control/protection: I.U.D.   Lifestyle     Physical activity     Days per week: Not on file     Minutes per session: Not on file     Stress: Not on file   Relationships     Social connections     Talks on phone: Not on file     Gets together: Not on file     Attends [employer] service: Not on file     Active member of club or organization: Not on file     Attends meetings of clubs or organizations: Not on file     Relationship status: Not on file     Intimate partner violence     Fear of current or ex partner: Not on file     Emotionally abused: Not on file     Physically abused: Not on file     Forced sexual activity: Not on file   Other Topics Concern     Not on file   Social History Narrative     Not on file       Marital Status:Partnered  Who lives in your household? Self, partner, and partner's 2 kids    Has anyone hurt you physically, for example by pushing, hitting, slapping or kicking you or forcing you to have sex? Denies  Do you feel threatened or controlled by a partner, ex-partner or anyone  "in your life? Denies    Sexual Health     Sexual concerns: No   STI History: Neg  Pregnancy History: No obstetric history on file.  LMP Patient's last menstrual period was 09/16/2020 (approximate). Has been having some irregularity recently  Last Pap Smear Date:   Lab Results   Component Value Date    PAP NIL 02/05/2015       Recommended Screening     Cholesterol Level (>44 yo or at risk):  Recommended and patient accepted testing.   Pap/HPV cotest every 5 years for women 30-65   Recommended and patient accepted testing.  Colon CA Screening (>50-75 ):  Recommended and patient accepted testing.   Breast CA Screening (>41 yo or 10 y before 1st degree relative diagnosis): Recommended and patient accepted testing.         Physical Exam:     Vitals: /80 (BP Location: Left arm, Patient Position: Sitting, Cuff Size: Adult Large)   Pulse 78   Temp 98.9  F (37.2  C) (Oral)   Ht 1.778 m (5' 10\")   Wt 112 kg (247 lb)   LMP 09/16/2020 (Approximate)   SpO2 98%   Breastfeeding No   BMI 35.44 kg/m    BMI= Body mass index is 35.44 kg/m .   GENERAL: healthy, alert and no distress  EYES: Eyes grossly normal to inspection, extraocular movements - intact, and PERRL  HENT: ear canals- normal; TMs- normal; Nose- normal; Mouth- no ulcers, no lesions  NECK: no tenderness, no adenopathy, no asymmetry, no masses, no stiffness; thyroid- normal to palpation  RESP: lungs clear to auscultation - no rales, no rhonchi, no wheezes  BREAST: no masses, no tenderness, no nipple discharge, no palpable axillary masses or adenopathy  CV: regular rates and rhythm, normal S1 S2, no S3 or S4 and no murmur, no click or rub -  ABDOMEN: soft, no tenderness, no  hepatosplenomegaly, no masses, normal bowel sounds  MS: extremities- no gross deformities noted, no edema  SKIN: no suspicious lesions, no rashes  NEURO: strength and tone- normal, sensory exam- grossly normal, mentation- intact, speech- normal, reflexes- symmetric  BACK: no CVA " tenderness, no paralumbar tenderness  - female: cervix- normal, no discharge  PSYCH: Alert and oriented times 3; speech- coherent , normal rate and volume; able to articulate logical thoughts, able to abstract reason, no tangential thoughts, no hallucinations or delusions, affect- normal  LYMPHATICS: ant. cervical- normal, post. cervical- normal, axillary- normal, supraclavicular- normal,     Procedure: IUD removal  During GYN exam, IUD strings visualized, grasped with ring forceps and easily removed without difficulty or complication.      Assessment and Plan      Pavithra was seen today for physical, iud and imm/inj.    Diagnoses and all orders for this visit:    Need for prophylactic vaccination and inoculation against influenza  -     INFLUENZA QUAD, RECOMBINANT, P-FREE (RIV4) (FLUBLOCK) [37289]    Routine general medical examination at a health care facility    Visit for screening mammogram  -     MA SCREENING DIGITAL BILAT; Future    Screening for cervical cancer    Screening for colon cancer  -     COLORECTAL SURGERY REFERRAL  -     HPV High Risk Types DNA Cervical  -     Pap imaged thin layer screen with HPV - recommended age 30 - 65 years (select HPV order below)    Screening for lipid disorders  -     Lipid [place] ([place])    Encounter for IUD removal  -     REMOVE INTRAUTERINE DEVICE        1) Updated screening today: pap/HPV cotesting, lipids, ordered mammo and colonoscopy  2) IUD removed per request, no need for contraception in current relationship  3) Discussed vaccination - flu shot today, advised to get shingrix for shingles prophylaxis  4) Mood stable, staying active, eating healthy      Options for treatment and follow-up care were reviewed with the patient . Pavithra Timothy and/or guardian engaged in the decision making process and verbalized understanding of the options discussed and agreed with the final plan.    Tracey Guidry MD

## 2020-09-24 PROBLEM — Z97.5 IUD (INTRAUTERINE DEVICE) IN PLACE: Status: RESOLVED | Noted: 2019-09-18 | Resolved: 2020-09-24

## 2020-09-24 ASSESSMENT — ANXIETY QUESTIONNAIRES: GAD7 TOTAL SCORE: 1

## 2020-09-26 LAB
COPATH REPORT: NORMAL
PAP: NORMAL

## 2020-09-29 LAB
FINAL DIAGNOSIS: NORMAL
HPV HR 12 DNA CVX QL NAA+PROBE: NEGATIVE
HPV16 DNA SPEC QL NAA+PROBE: NEGATIVE
HPV18 DNA SPEC QL NAA+PROBE: NEGATIVE
SPECIMEN DESCRIPTION: NORMAL
SPECIMEN SOURCE CVX/VAG CYTO: NORMAL

## 2020-10-05 ENCOUNTER — TRANSCRIBE ORDERS (OUTPATIENT)
Dept: OTHER | Age: 50
End: 2020-10-05

## 2020-10-05 ENCOUNTER — ANCILLARY PROCEDURE (OUTPATIENT)
Dept: MAMMOGRAPHY | Facility: CLINIC | Age: 50
End: 2020-10-05
Attending: FAMILY MEDICINE
Payer: COMMERCIAL

## 2020-10-05 DIAGNOSIS — Z12.31 VISIT FOR SCREENING MAMMOGRAM: ICD-10-CM

## 2020-10-05 DIAGNOSIS — Z11.59 ENCOUNTER FOR SCREENING FOR OTHER VIRAL DISEASES: Primary | ICD-10-CM

## 2020-10-05 DIAGNOSIS — Z12.11 SCREENING FOR COLON CANCER: Primary | ICD-10-CM

## 2020-10-05 PROCEDURE — 77067 SCR MAMMO BI INCL CAD: CPT | Mod: 26 | Performed by: RADIOLOGY

## 2020-10-05 PROCEDURE — 77067 SCR MAMMO BI INCL CAD: CPT

## 2020-10-15 DIAGNOSIS — Z12.11 SPECIAL SCREENING FOR MALIGNANT NEOPLASMS, COLON: Primary | ICD-10-CM

## 2020-10-15 RX ORDER — BISACODYL 5 MG/1
10 TABLET, DELAYED RELEASE ORAL DAILY
Qty: 4 TABLET | Refills: 0 | Status: SHIPPED | OUTPATIENT
Start: 2020-10-15 | End: 2020-10-17

## 2020-10-15 NOTE — NURSING NOTE
eRx Dulcolax et Golytely: [id number] - [place], [place] - [address]  [facility]

## 2020-10-19 DIAGNOSIS — Z11.59 ENCOUNTER FOR SCREENING FOR OTHER VIRAL DISEASES: ICD-10-CM

## 2020-10-19 PROCEDURE — U0003 INFECTIOUS AGENT DETECTION BY NUCLEIC ACID (DNA OR RNA); SEVERE ACUTE RESPIRATORY SYNDROME CORONAVIRUS 2 (SARS-COV-2) (CORONAVIRUS DISEASE [COVID-19]), AMPLIFIED PROBE TECHNIQUE, MAKING USE OF HIGH THROUGHPUT TECHNOLOGIES AS DESCRIBED BY CMS-2020-01-R: HCPCS | Performed by: INTERNAL MEDICINE

## 2020-10-20 LAB
SARS-COV-2 RNA SPEC QL NAA+PROBE: NOT DETECTED
SPECIMEN SOURCE: NORMAL

## 2020-10-22 ENCOUNTER — HOSPITAL ENCOUNTER (OUTPATIENT)
Facility: AMBULATORY SURGERY CENTER | Age: 50
Discharge: HOME | End: 2020-10-22
Attending: INTERNAL MEDICINE | Admitting: INTERNAL MEDICINE
Payer: COMMERCIAL

## 2020-10-22 VITALS
HEART RATE: 62 BPM | OXYGEN SATURATION: 97 % | TEMPERATURE: 97 F | SYSTOLIC BLOOD PRESSURE: 124 MMHG | RESPIRATION RATE: 18 BRPM | DIASTOLIC BLOOD PRESSURE: 86 MMHG

## 2020-10-22 LAB
COLONOSCOPY: NORMAL
HCG UR QL: NEGATIVE
INTERNAL QC OK POCT: YES

## 2020-10-22 PROCEDURE — 45378 DIAGNOSTIC COLONOSCOPY: CPT

## 2020-10-22 RX ORDER — SIMETHICONE
LIQUID (ML) MISCELLANEOUS PRN
Status: DISCONTINUED | OUTPATIENT
Start: 2020-10-22 | End: 2020-10-22 | Stop reason: HOSPADM

## 2020-10-22 RX ORDER — PROCHLORPERAZINE MALEATE 10 MG
10 TABLET ORAL EVERY 6 HOURS PRN
Status: DISCONTINUED | OUTPATIENT
Start: 2020-10-22 | End: 2020-10-23 | Stop reason: HOSPADM

## 2020-10-22 RX ORDER — ONDANSETRON 4 MG/1
4 TABLET, ORALLY DISINTEGRATING ORAL EVERY 6 HOURS PRN
Status: DISCONTINUED | OUTPATIENT
Start: 2020-10-22 | End: 2020-10-23 | Stop reason: HOSPADM

## 2020-10-22 RX ORDER — FENTANYL CITRATE 50 UG/ML
INJECTION, SOLUTION INTRAMUSCULAR; INTRAVENOUS PRN
Status: DISCONTINUED | OUTPATIENT
Start: 2020-10-22 | End: 2020-10-22 | Stop reason: HOSPADM

## 2020-10-22 RX ORDER — FLUMAZENIL 0.1 MG/ML
0.2 INJECTION, SOLUTION INTRAVENOUS
Status: DISCONTINUED | OUTPATIENT
Start: 2020-10-22 | End: 2020-10-23 | Stop reason: HOSPADM

## 2020-10-22 RX ORDER — LIDOCAINE 40 MG/G
CREAM TOPICAL
Status: DISCONTINUED | OUTPATIENT
Start: 2020-10-22 | End: 2020-10-22 | Stop reason: HOSPADM

## 2020-10-22 RX ORDER — ONDANSETRON 2 MG/ML
4 INJECTION INTRAMUSCULAR; INTRAVENOUS
Status: DISCONTINUED | OUTPATIENT
Start: 2020-10-22 | End: 2020-10-22 | Stop reason: HOSPADM

## 2020-10-22 RX ORDER — NALOXONE HYDROCHLORIDE 0.4 MG/ML
.1-.4 INJECTION, SOLUTION INTRAMUSCULAR; INTRAVENOUS; SUBCUTANEOUS
Status: DISCONTINUED | OUTPATIENT
Start: 2020-10-22 | End: 2020-10-23 | Stop reason: HOSPADM

## 2020-10-22 RX ORDER — ONDANSETRON 2 MG/ML
4 INJECTION INTRAMUSCULAR; INTRAVENOUS EVERY 6 HOURS PRN
Status: DISCONTINUED | OUTPATIENT
Start: 2020-10-22 | End: 2020-10-23 | Stop reason: HOSPADM

## 2020-10-22 NOTE — OR NURSING
Discharge instructions reviewed with patient's partner Leslie via phone. No questions or concerns at this time.

## 2021-03-01 DIAGNOSIS — F41.8 DEPRESSION WITH ANXIETY: ICD-10-CM

## 2021-03-01 NOTE — TELEPHONE ENCOUNTER

## 2021-03-03 RX ORDER — BUPROPION HYDROCHLORIDE 100 MG/1
100 TABLET, EXTENDED RELEASE ORAL 2 TIMES DAILY
Qty: 180 TABLET | Refills: 3 | Status: SHIPPED | OUTPATIENT
Start: 2021-03-03 | End: 2021-09-17

## 2021-03-04 DIAGNOSIS — F41.8 DEPRESSION WITH ANXIETY: ICD-10-CM

## 2021-03-04 NOTE — TELEPHONE ENCOUNTER

## 2021-05-14 ENCOUNTER — TRANSFERRED RECORDS (OUTPATIENT)
Dept: HEALTH INFORMATION MANAGEMENT | Facility: CLINIC | Age: 51
End: 2021-05-14

## 2021-09-17 DIAGNOSIS — F41.8 DEPRESSION WITH ANXIETY: ICD-10-CM

## 2021-09-17 RX ORDER — BUPROPION HYDROCHLORIDE 100 MG/1
100 TABLET, EXTENDED RELEASE ORAL 2 TIMES DAILY
Qty: 180 TABLET | Refills: 3 | Status: SHIPPED | OUTPATIENT
Start: 2021-09-17 | End: 2022-11-13

## 2021-09-17 NOTE — TELEPHONE ENCOUNTER
"Request for medication refill: Bupropion 100 mg tablets. PLEASE PROVIDE DIRECTIONG, QTY, REFILLS THANKS.     Providers if patient needs an appointment and you are willing to give a one month supply please refill for one month and  send a letter/MyChart using \".SMILLIMITEDREFILL\" .smillimited and route chart to \"P [facility] \" (Giving one month refill in non controlled medications is strongly recommended before denial)    If refill has been denied, meaning absolutely no refills without visit, please complete the smart phrase \".smirxrefuse\" and route it to the \"P [facility] MED REFILLS\"  pool to inform the patient and the pharmacy.    Lou Raygoza        "

## 2021-10-03 ENCOUNTER — HEALTH MAINTENANCE LETTER (OUTPATIENT)
Age: 51
End: 2021-10-03

## 2021-11-28 ENCOUNTER — HEALTH MAINTENANCE LETTER (OUTPATIENT)
Age: 51
End: 2021-11-28

## 2022-01-23 ENCOUNTER — HEALTH MAINTENANCE LETTER (OUTPATIENT)
Age: 52
End: 2022-01-23

## 2022-04-21 ENCOUNTER — ANCILLARY PROCEDURE (OUTPATIENT)
Dept: MAMMOGRAPHY | Facility: CLINIC | Age: 52
End: 2022-04-21
Attending: FAMILY MEDICINE
Payer: COMMERCIAL

## 2022-04-21 DIAGNOSIS — Z12.31 VISIT FOR SCREENING MAMMOGRAM: ICD-10-CM

## 2022-04-21 PROCEDURE — 77067 SCR MAMMO BI INCL CAD: CPT

## 2022-04-21 PROCEDURE — 77067 SCR MAMMO BI INCL CAD: CPT | Mod: 26

## 2022-08-03 ENCOUNTER — MYC REFILL (OUTPATIENT)
Dept: FAMILY MEDICINE | Facility: CLINIC | Age: 52
End: 2022-08-03

## 2022-08-03 DIAGNOSIS — F41.8 DEPRESSION WITH ANXIETY: ICD-10-CM

## 2022-09-11 ENCOUNTER — HEALTH MAINTENANCE LETTER (OUTPATIENT)
Age: 52
End: 2022-09-11

## 2022-10-20 ENCOUNTER — TELEPHONE (OUTPATIENT)
Dept: FAMILY MEDICINE | Facility: CLINIC | Age: 52
End: 2022-10-20

## 2022-10-20 NOTE — TELEPHONE ENCOUNTER
Reason for call: Reschedule appointment     Attempt to reach: 1st    Outcome:Left voicemail    Detailed message left? No    Please return call to [facility]     Clinic phone number [phone]    Patient's covid vaccine booster and flu vaccine scheduled at [facility] on Monday 10/24 needs to be moved to the pharmacy schedule due to low staffing. Patient is scheduled at the [facility] on Monday 10/24/22 @ 3:20PM

## 2022-11-03 ASSESSMENT — ENCOUNTER SYMPTOMS
ABDOMINAL PAIN: 0
DIARRHEA: 0
HEADACHES: 0
JOINT SWELLING: 1
DYSURIA: 0
CONSTIPATION: 0
MYALGIAS: 0
HEMATOCHEZIA: 0
SHORTNESS OF BREATH: 0
EYE PAIN: 0
ARTHRALGIAS: 1
BREAST MASS: 0
NAUSEA: 0
PALPITATIONS: 0
NERVOUS/ANXIOUS: 0
DIZZINESS: 0
PARESTHESIAS: 0
SORE THROAT: 0
WEAKNESS: 0
HEMATURIA: 0
FEVER: 0
CHILLS: 0
FREQUENCY: 0
COUGH: 0
HEARTBURN: 0

## 2022-11-03 ASSESSMENT — ANXIETY QUESTIONNAIRES
IF YOU CHECKED OFF ANY PROBLEMS ON THIS QUESTIONNAIRE, HOW DIFFICULT HAVE THESE PROBLEMS MADE IT FOR YOU TO DO YOUR WORK, TAKE CARE OF THINGS AT HOME, OR GET ALONG WITH OTHER PEOPLE: SOMEWHAT DIFFICULT
GAD7 TOTAL SCORE: 4
5. BEING SO RESTLESS THAT IT IS HARD TO SIT STILL: NOT AT ALL
7. FEELING AFRAID AS IF SOMETHING AWFUL MIGHT HAPPEN: SEVERAL DAYS
2. NOT BEING ABLE TO STOP OR CONTROL WORRYING: SEVERAL DAYS
3. WORRYING TOO MUCH ABOUT DIFFERENT THINGS: SEVERAL DAYS
1. FEELING NERVOUS, ANXIOUS, OR ON EDGE: SEVERAL DAYS
6. BECOMING EASILY ANNOYED OR IRRITABLE: NOT AT ALL

## 2022-11-03 ASSESSMENT — PATIENT HEALTH QUESTIONNAIRE - PHQ9: 5. POOR APPETITE OR OVEREATING: NOT AT ALL

## 2022-11-04 ENCOUNTER — OFFICE VISIT (OUTPATIENT)
Dept: FAMILY MEDICINE | Facility: CLINIC | Age: 52
End: 2022-11-04
Payer: COMMERCIAL

## 2022-11-04 VITALS
DIASTOLIC BLOOD PRESSURE: 88 MMHG | HEIGHT: 70 IN | OXYGEN SATURATION: 99 % | BODY MASS INDEX: 38.48 KG/M2 | SYSTOLIC BLOOD PRESSURE: 138 MMHG | RESPIRATION RATE: 16 BRPM | HEART RATE: 62 BPM | WEIGHT: 268.8 LBS

## 2022-11-04 DIAGNOSIS — Z12.11 SCREENING FOR COLON CANCER: ICD-10-CM

## 2022-11-04 DIAGNOSIS — Z00.00 ROUTINE GENERAL MEDICAL EXAMINATION AT A HEALTH CARE FACILITY: Primary | ICD-10-CM

## 2022-11-04 DIAGNOSIS — Z11.59 NEED FOR HEPATITIS C SCREENING TEST: ICD-10-CM

## 2022-11-04 DIAGNOSIS — Z87.891 PERSONAL HISTORY OF TOBACCO USE: ICD-10-CM

## 2022-11-04 DIAGNOSIS — R03.0 ELEVATED BP WITHOUT DIAGNOSIS OF HYPERTENSION: ICD-10-CM

## 2022-11-04 DIAGNOSIS — H61.91 SKIN LESION OF RIGHT EAR: ICD-10-CM

## 2022-11-04 DIAGNOSIS — Z23 NEED FOR VACCINATION: ICD-10-CM

## 2022-11-04 LAB — HCV AB SERPL QL IA: NONREACTIVE

## 2022-11-04 PROCEDURE — 36415 COLL VENOUS BLD VENIPUNCTURE: CPT | Performed by: STUDENT IN AN ORGANIZED HEALTH CARE EDUCATION/TRAINING PROGRAM

## 2022-11-04 PROCEDURE — 90746 HEPB VACCINE 3 DOSE ADULT IM: CPT | Performed by: STUDENT IN AN ORGANIZED HEALTH CARE EDUCATION/TRAINING PROGRAM

## 2022-11-04 PROCEDURE — 86803 HEPATITIS C AB TEST: CPT | Performed by: STUDENT IN AN ORGANIZED HEALTH CARE EDUCATION/TRAINING PROGRAM

## 2022-11-04 PROCEDURE — 90471 IMMUNIZATION ADMIN: CPT | Performed by: STUDENT IN AN ORGANIZED HEALTH CARE EDUCATION/TRAINING PROGRAM

## 2022-11-04 PROCEDURE — 99396 PREV VISIT EST AGE 40-64: CPT | Mod: 25 | Performed by: STUDENT IN AN ORGANIZED HEALTH CARE EDUCATION/TRAINING PROGRAM

## 2022-11-04 ASSESSMENT — ENCOUNTER SYMPTOMS
FEVER: 0
ABDOMINAL PAIN: 0
EYE PAIN: 0
CONSTIPATION: 0
PALPITATIONS: 0
COUGH: 0
SHORTNESS OF BREATH: 0
DIARRHEA: 0
NERVOUS/ANXIOUS: 0
NAUSEA: 0
MYALGIAS: 0
DIZZINESS: 0
JOINT SWELLING: 1
SORE THROAT: 0
WEAKNESS: 0
DYSURIA: 0
HEMATURIA: 0
FREQUENCY: 0
ARTHRALGIAS: 1
HEADACHES: 0
CHILLS: 0

## 2022-11-04 ASSESSMENT — PATIENT HEALTH QUESTIONNAIRE - PHQ9: SUM OF ALL RESPONSES TO PHQ QUESTIONS 1-9: 7

## 2022-11-04 ASSESSMENT — ANXIETY QUESTIONNAIRES: GAD7 TOTAL SCORE: 4

## 2022-11-04 NOTE — PROGRESS NOTES
SUBJECTIVE:   CC: Pavithra is an 52 year old who presents for preventive health visit.       Healthy Habits:     Getting at least 3 servings of Calcium per day:  Yes    Bi-annual eye exam:  Yes    Dental care twice a year:  Yes    Sleep apnea or symptoms of sleep apnea:  None    Diet:  Vegetarian/vegan    Frequency of exercise:  1 day/week    Duration of exercise:  15-30 minutes    Taking medications regularly:  Yes    Medication side effects:  Not applicable    PHQ-2 Total Score: 1    Additional concerns today:  No      Elevated BP:  - has decreased activity since COVID. Was walking a lot more before when driving to work and having to walk to and from parking lot  - lives in [place], feels safe walking  - likes dancing, walking  - dance parties with kids  - stopped riding bike since COVID  - nothing they like to do during the winter  - has aerobics videos they like  - kids 7,9  - hard to get started with exercise  - BP number will inspire them    On DEI   - work stressors in last week    Working on finding couples therapy  - together since 2018    Today's PHQ-2 Score:   PHQ-2 ( 1999 Pf[employer]) 11/3/2022   Q1: Little interest or pleasure in doing things 0   Q2: Feeling down, depressed or hopeless 1   PHQ-2 Score 1   PHQ-2 Total Score (12-17 Years)- Positive if 3 or more points; Administer PHQ-A if positive -   Q1: Little interest or pleasure in doing things Not at all   Q2: Feeling down, depressed or hopeless Several days   PHQ-2 Score 1       Abuse: Current or Past (Physical, Sexual or Emotional) - No  Do you feel safe in your environment? Yes    Have you ever done Advance Care Planning? (For example, a Health Directive, POLST, or a discussion with a medical provider or your loved ones about your wishes): No, advance care planning information given to patient to review.  Patient plans to discuss their wishes with loved ones or provider.      Social History     Tobacco Use     Smoking status: Former      Packs/day: 0.25     Types: Cigarettes     Start date: 7/15/2015     Smokeless tobacco: Never   Substance Use Topics     Alcohol use: Not Currently     Alcohol/week: 0.0 standard drinks     Comment: ocassional         Alcohol Use 11/3/2022   Prescreen: >3 drinks/day or >7 drinks/week? No       Reviewed orders with patient.  Reviewed health maintenance and updated orders accordingly    Breast Cancer Screening:  Any new diagnosis of family breast, ovarian, or bowel cancer?   no    FHS-7:   Breast CA Risk Assessment (FHS-7) 4/21/2022   Did any of your first-degree relatives have breast or ovarian cancer? No   Did any of your relatives have bilateral breast cancer? No   Did any man in your family have breast cancer? No   Did any woman in your family have breast and ovarian cancer? No   Did any woman in your family have breast cancer before age 50 y? Yes   Do you have 2 or more relatives with breast and/or ovarian cancer? Yes   Do you have 2 or more relatives with breast and/or bowel cancer? No     Mammogram Screening: Recommended annual mammography. Completed 4/21/22.  Pertinent mammograms are reviewed under the imaging tab.    History of abnormal Pap smear: NO - age 30-65 PAP every 5 years with negative HPV co-testing recommended  PAP / HPV Latest Ref Rng & Units 9/23/2020 2/5/2015   PAP (Historical) - NIL NIL   HPV16 NEG:Negative Negative -   HPV18 NEG:Negative Negative -   HRHPV NEG:Negative Negative -     Reviewed and updated as needed this visit by clinical staff   Tobacco  Allergies  Meds   Med Hx  Surg Hx  Fam Hx  Soc Hx          Review of Systems   Constitutional: Negative for chills and fever.   HENT: Negative for congestion, ear pain, hearing loss and sore throat.    Eyes: Negative for pain and visual disturbance.   Respiratory: Negative for cough and shortness of breath.    Cardiovascular: Negative for chest pain and palpitations.   Gastrointestinal: Negative for abdominal pain, constipation, diarrhea  "and nausea.   Genitourinary: Negative for dysuria, frequency, genital sores, hematuria and urgency.   Musculoskeletal: Positive for arthralgias and joint swelling. Negative for myalgias.   Skin: Negative for rash.   Neurological: Negative for dizziness, weakness and headaches.   Psychiatric/Behavioral: The patient is not nervous/anxious.        Answers for HPI/ROS submitted by the patient on 11/3/2022  Blood in stool: No  heartburn: No  peripheral edema: No  mood changes: No  Skin sensation changes: No  pelvic pain: No  vaginal bleeding: No  vaginal discharge: No  tenderness: No  breast mass: No  breast discharge: No  impotence: No  penile discharge: No         OBJECTIVE:   /88   Pulse 62   Resp 16   Ht 1.778 m (5' 10\")   Wt 121.9 kg (268 lb 12.8 oz)   SpO2 99%   BMI 38.57 kg/m    Physical Exam  Constitutional:       General: Pavithra Aguirre is not in acute distress.     Appearance: Normal appearance. Pavithra Aguirre is not ill-appearing.   HENT:      Head: Normocephalic and atraumatic.      Right Ear: Tympanic membrane and ear canal normal.      Left Ear: Tympanic membrane and ear canal normal.      Nose: No congestion or rhinorrhea.      Mouth/Throat:      Mouth: Mucous membranes are moist.      Pharynx: Oropharynx is clear. No oropharyngeal exudate.   Eyes:      Conjunctiva/sclera: Conjunctivae normal.      Pupils: Pupils are equal, round, and reactive to light.   Cardiovascular:      Rate and Rhythm: Normal rate and regular rhythm.      Heart sounds: Normal heart sounds. No murmur heard.    No friction rub. No gallop.   Pulmonary:      Effort: Pulmonary effort is normal.      Breath sounds: Normal breath sounds. No wheezing, rhonchi or rales.   Abdominal:      General: Abdomen is flat. Bowel sounds are normal. There is no distension.      Palpations: Abdomen is soft. There is no mass.      Tenderness: There is no abdominal tenderness. There is no guarding or rebound.   Musculoskeletal:      Cervical " back: Normal range of motion and neck supple.      Comments: Mild bilateral pitting edema, symmetric   Lymphadenopathy:      Cervical: No cervical adenopathy.   Skin:     General: Skin is warm and dry.      Comments: Erythematous papule with visible capillaries on the inferior border of the earlobe. No ulceration. Nontender.    Neurological:      Mental Status: Pavithra Aguirre is alert and oriented to person, place, and time.      Comments: Decreased DTR of right patella. S/p right knee surgery   Psychiatric:         Mood and Affect: Mood normal.         Behavior: Behavior normal.         Thought Content: Thought content normal.         Judgment: Judgment normal.         ASSESSMENT/PLAN:   Pavithra was seen today for physical.    Diagnoses and all orders for this visit:    Routine general medical examination at a health care facility  Up to date on pap and mammogram.    Need for hepatitis C screening test  -     Hepatitis C Screen Reflex to HCV RNA Quant and Genotype; Future  -     Hepatitis C Screen Reflex to HCV RNA Quant and Genotype    Personal history of tobacco use  Smoked for 29 years, quit 2015. About 1 ppd for 15 years. About half pack for 14 years. Total pack years: 22. Qualifies for lung cancer screening. Discussed risks and benefits with patient and they wants screening. Reports that additional motivator is their mom's diagnosis of COPD.  -     CT Chest Lung Cancer Scrn Low Dose wo; Future    Screening for colon cancer  History of colonoscopy with incomplete prep. Discussed screening options and desires to complete cologuard. Understands that they would need colonoscopy if screening is abnormal.  -     COLOGUARD([employer])    Need for vaccination  -     HEPATITIS B VACCINE,ADULT,IM    Elevated BP without diagnosis of hypertension  Discussed obtaining BP cuff to check BP at home. Recommend getting a few readings a couple of times a week for a few weeks, and sending [employer] message to me or PCP with  "compilation of results. Discussed increasing exercise with walking/dancing/aerobics. Motivated to change due to elevated BP.    Skin lesion of right ear  Small erythematous papule with visible capillaries on inferior border of earlobe. Patient was not aware of it's presence, so unknown chronicity. May be a benign lesion vs malignancy (basal cell). No ulceration. Advised patient to monitor. If it persists or has concerning changes (like ulceration), would recommend referral to dermatology for excision.    Other orders  -     Cancel: ZOSTER VACCINE RECOMBINANT ADJUVANTED (SHINGRIX)          COUNSELING:  Reviewed preventive health counseling, as reflected in patient instructions  Special attention given to:        Regular exercise       Colorectal Cancer Screening    Estimated body mass index is 38.57 kg/m  as calculated from the following:    Height as of this encounter: 1.778 m (5' 10\").    Weight as of this encounter: 121.9 kg (268 lb 12.8 oz).      Pavithra Aguirre reports that Pavithra Aguirre has quit smoking. Pavithra Aguirre's smoking use included cigarettes. Pavithra Aguirre started smoking about 7 years ago. Pavithra Aguirre smoked an average of .25 packs per day. Pavithra Aguirre has never used smokeless tobacco.        Aparna Purdy MD  [facility]  "

## 2022-11-04 NOTE — PATIENT INSTRUCTIONS
Today's visit:  We are doing screening tests for Hepatitis C (blood test), and screening for lung cancer based on your tobacco history.    You came up with some great ideas for increasing your physical activity. You can get a blood pressure cuff and take your Blood pressure a handful of times over the next few weeks. Send a [employer] message to me or Dr. Guidry with the results, and we can see if we would recommend that you be on a blood pressure medication.    Keep an eye on the spot on your right ear. If it is still there in 1-2 months, reach out and we can place a dermatology referral. If it becomes bigger or opens up (ulcerates), also reach out because those are things more concerning for cancer.        Lung Cancer Screening   Frequently Asked Questions  If you are at high-risk for lung cancer, getting screened with low-dose computed tomography (LDCT) every year can help save your life. This handout offers answers to some of the most common questions about lung cancer screening. If you have other questions, please call [phone] ([phone]).     What is it?  Lung cancer screening uses special X-ray technology to create an image of your lung tissue. The exam is quick and easy and takes less than 10 seconds. We don t give you any medicine or use any needles. You can eat before and after the exam. You don t need to change your clothes as long as the clothing on your chest doesn t contain metal. But, you do need to be able to hold your breath for at least 6 seconds during the exam.    What is the goal of lung cancer screening?  The goal of lung cancer screening is to save lives. Many times, lung cancer is not found until a person starts having physical symptoms. Lung cancer screening can help detect lung cancer in the earliest stages when it may be easier to treat.    Who should be screened for lung cancer?  We suggest lung cancer screening for anyone who is at high-risk for lung cancer. You are in the  high-risk group if you:     are between the ages of 55 and 79, and   have smoked at least 1 pack of cigarettes a day for 20 or more years, and   still smoke or have quit within the past 15 years.    However, if you have a new cough or shortness of breath, you should talk to your doctor before being screened.    Why does it matter if I have symptoms?  Certain symptoms can be a sign that you have a condition in your lungs that should be checked and treated by your doctor. These symptoms include fever, chest pain, a new or changing cough, shortness of breath that you have never felt before, coughing up blood or unexplained weight loss. Having any of these symptoms can greatly affect the results of lung cancer screening.       Should all smokers get an LDCT lung cancer screening exam?  It depends. Lung cancer screening is for a very specific group of men and women who have a history of heavy smoking over a long period of time (see  Who should be screened for lung cancer  above).  I am in the high-risk group, but have been diagnosed with cancer in the past. Is LDCT lung cancer screening right for me?  In some cases, you should not have LDCT lung screening, such as when your doctor is already following your cancer with CT scan studies. Your doctor will help you decide if LDCT lung screening is right for you.  Do I need to have a screening exam every year?  Yes. If you are in the high-risk group described earlier, you should get an LDCT lung cancer screening exam every year until you are 79, or are no longer willing or able to undergo screening and possible procedures to diagnose and treat lung cancer.  How effective is LDCT at preventing death from lung cancer?  Studies have shown that LDCT lung cancer screening can lower the risk of death from lung cancer by 20 percent in people who are at high-risk.  What are the risks?  There are some risks and limitations of LDCT lung cancer screening. We want to make sure you  understand the risks and benefits, so please let us know if you have any questions. Your doctor may want to talk with you more about these risks.   Radiation exposure: As with any exam that uses radiation, there is a very small increased risk of cancer. The amount of radiation in LDCT is small--about the same amount a person would get from a mammogram. Your doctor orders the exam when he or she feels the potential benefits outweigh the risks.   False negatives: No test is perfect, including LDCT. It is possible that you may have a medical condition, including lung cancer, that is not found during your exam. This is called a false negative result.   False positives and more testing: LDCT very often finds something in the lung that could be cancer, but in fact is not. This is called a false positive result. False positive tests often cause anxiety. To make sure these findings are not cancer, you may need to have more tests. These tests will be done only if you give us permission. Sometimes patients need a treatment that can have side effects, such as a biopsy. For more information on false positives, see  What can I expect from the results?    Findings not related to lung cancer: Your LDCT exam also takes pictures of areas of your body next to your lungs. In a very small number of cases, the CT scan will show an abnormal finding in one of these areas, such as your kidneys, adrenal glands, liver or thyroid. This finding may not be serious, but you may need more tests. Your doctor can help you decide what other tests you may need, if any.  What can I expect from the results?  About 1 out of 4 LDCT exams will find something that may need more tests. Most of the time, these findings are lung nodules. Lung nodules are very small collections of tissue in the lung. These nodules are very common, and the vast majority--more than 97 percent--are not cancer (benign). Most are normal lymph nodes or small areas of scarring from  past infections.  But, if a small lung nodule is found to be cancer, the cancer can be cured more than 90 percent of the time. To know if the nodule is cancer, we may need to get more images before your next yearly screening exam. If the nodule has suspicious features (for example, it is large, has an odd shape or grows over time), we will refer you to a specialist for further testing.  Will my doctor also get the results?  Yes. Your doctor will get a copy of your results.  Is it okay to keep smoking now that there s a cancer screening exam?  No. Tobacco is one of the strongest cancer-causing agents. It causes not only lung cancer, but other cancers and cardiovascular (heart) diseases as well. The damage caused by smoking builds over time. This means that the longer you smoke, the higher your risk of disease. While it is never too late to quit, the sooner you quit, the better.  Where can I find help to quit smoking?  The best way to prevent lung cancer is to stop smoking. If you have already quit smoking, congratulations and keep it up! For help on quitting smoking, please call [employer] at 3-988-QUITNOW (1-500.182.6269) or the American Cancer Society at 1-631.995.9704 to find local resources near you.  One-on-one health coaching:  If you d prefer to work individually with a health care provider on tobacco cessation, we offer:     Medication Therapy Management:  Our specially trained pharmacists work closely with you and your doctor to help you quit smoking.  Call [phone] or [phone] (toll free).    Preventive Health Recommendations  Ages 50 - 64    Yearly exam: See your health care provider every year in order to  Review health changes.   Discuss preventive care.    Review your medicines if your doctor has prescribed any.    Get a Pap test every three years (unless you have an abnormal result and your provider advises testing more often).  If you get Pap tests with HPV test, you only need to test every  5 years, unless you have an abnormal result.   You do not need a Pap test if your uterus was removed (hysterectomy) and you have not had cancer.  You should be tested each year for STDs (sexually transmitted diseases) if you're at risk.   Have a mammogram every 1 to 2 years.  Have a colonoscopy at age 50, or have a yearly FIT test (stool test). These exams screen for colon cancer.    Have a cholesterol test every 5 years, or more often if advised.  Have a diabetes test (fasting glucose) every three years. If you are at risk for diabetes, you should have this test more often.   If you are at risk for osteoporosis (brittle bone disease), think about having a bone density scan (DEXA).    Shots: Get a flu shot each year. Get a tetanus shot every 10 years.    Nutrition:   Eat at least 5 servings of fruits and vegetables each day.  Eat whole-grain bread, whole-wheat pasta and brown rice instead of white grains and rice.  Get adequate Calcium and Vitamin D.     Lifestyle  Exercise at least 150 minutes a week (30 minutes a day, 5 days a week). This will help you control your weight and prevent disease.  Limit alcohol to one drink per day.  No smoking.   Wear sunscreen to prevent skin cancer.   See your dentist every six months for an exam and cleaning.  See your eye doctor every 1 to 2 years.

## 2022-11-05 NOTE — PROGRESS NOTES
Preceptor Attestation:   Patient seen, evaluated and discussed with the resident. I have verified the content of the note, which accurately reflects my assessment of the patient and the plan of care.   Supervising Physician:  Bnuny Narvaez MD

## 2022-11-11 ENCOUNTER — ANCILLARY PROCEDURE (OUTPATIENT)
Dept: CT IMAGING | Facility: CLINIC | Age: 52
End: 2022-11-11
Attending: FAMILY MEDICINE
Payer: COMMERCIAL

## 2022-11-11 ENCOUNTER — TELEPHONE (OUTPATIENT)
Dept: FAMILY MEDICINE | Facility: CLINIC | Age: 52
End: 2022-11-11

## 2022-11-11 DIAGNOSIS — Z87.891 PERSONAL HISTORY OF TOBACCO USE: ICD-10-CM

## 2022-11-11 DIAGNOSIS — F41.8 DEPRESSION WITH ANXIETY: ICD-10-CM

## 2022-11-11 LAB — RADIOLOGIST FLAGS: NORMAL

## 2022-11-11 PROCEDURE — 71271 CT THORAX LUNG CANCER SCR C-: CPT | Mod: GC | Performed by: RADIOLOGY

## 2022-11-11 NOTE — TELEPHONE ENCOUNTER

## 2022-11-11 NOTE — TELEPHONE ENCOUNTER
[facility] phone call message- general phone call:    Reason for call: Report 'incidental imaging findings.'    Return call needed: Yes    OK to leave a message on voice mail? Yes    Primary language: English      needed? No    Call taken on November 11, 2022 at 12:23 PM by Kristy Dueñas

## 2022-11-13 RX ORDER — BUPROPION HYDROCHLORIDE 100 MG/1
100 TABLET, EXTENDED RELEASE ORAL 2 TIMES DAILY
Qty: 180 TABLET | Refills: 3 | Status: SHIPPED | OUTPATIENT
Start: 2022-11-13 | End: 2023-10-25

## 2022-11-16 DIAGNOSIS — Q45.3 PANCREATIC ABNORMALITY: Primary | ICD-10-CM

## 2022-12-27 ENCOUNTER — ANCILLARY PROCEDURE (OUTPATIENT)
Dept: MRI IMAGING | Facility: CLINIC | Age: 52
End: 2022-12-27
Attending: STUDENT IN AN ORGANIZED HEALTH CARE EDUCATION/TRAINING PROGRAM
Payer: COMMERCIAL

## 2022-12-27 DIAGNOSIS — Q45.3 PANCREATIC ABNORMALITY: ICD-10-CM

## 2022-12-27 PROCEDURE — A9585 GADOBUTROL INJECTION: HCPCS | Performed by: RADIOLOGY

## 2022-12-27 PROCEDURE — 74183 MRI ABD W/O CNTR FLWD CNTR: CPT | Performed by: RADIOLOGY

## 2022-12-27 RX ORDER — GADOBUTROL 604.72 MG/ML
15 INJECTION INTRAVENOUS ONCE
Status: COMPLETED | OUTPATIENT
Start: 2022-12-27 | End: 2022-12-27

## 2022-12-27 RX ADMIN — GADOBUTROL 12 ML: 604.72 INJECTION INTRAVENOUS at 18:43

## 2022-12-28 NOTE — DISCHARGE INSTRUCTIONS
MRI Contrast Discharge Instructions    The IV contrast you received today will pass out of your body in your  urine. This will happen in the next 24 hours. You will not feel this process.  Your urine will not change color.    Drink at least 4 extra glasses of water or juice today (unless your doctor  has restricted your fluids). This reduces the stress on your kidneys.  You may take your regular medicines.    If you are on dialysis: It is best to have dialysis today.    If you have a reaction: Most reactions happen right away. If you have  any new symptoms after leaving the hospital (such as hives or swelling),  call your hospital at the correct number below. Or call your family doctor.  If you have breathing distress or wheezing, call 911.    Special instructions: ***    I have read and understand the above information.    Signature:______________________________________ Date:___________    Staff:__________________________________________ Date:___________     Time:__________    [facility] Departments:    ___[place]: [phone]  ___[facility]: [phone]  ___[place]: [phone] ___[facility]: [phone]  ___[facility]: [phone]  ___[facility]: [phone]  ___[phone]  ___[facility]: [phone]  ___[phone]

## 2022-12-29 ENCOUNTER — TELEPHONE (OUTPATIENT)
Dept: GASTROENTEROLOGY | Facility: CLINIC | Age: 52
End: 2022-12-29

## 2022-12-29 ENCOUNTER — TELEPHONE (OUTPATIENT)
Dept: FAMILY MEDICINE | Facility: CLINIC | Age: 52
End: 2022-12-29

## 2022-12-29 ENCOUNTER — PATIENT OUTREACH (OUTPATIENT)
Dept: GASTROENTEROLOGY | Facility: CLINIC | Age: 52
End: 2022-12-29

## 2022-12-29 DIAGNOSIS — K86.89 PANCREATIC MASS: Primary | ICD-10-CM

## 2022-12-29 DIAGNOSIS — K86.89 PANCREATIC MASS: ICD-10-CM

## 2022-12-29 DIAGNOSIS — Q45.3 PANCREATIC ABNORMALITY: Primary | ICD-10-CM

## 2022-12-29 DIAGNOSIS — R93.3 ABNORMAL FINDING ON GI TRACT IMAGING: Primary | ICD-10-CM

## 2022-12-29 NOTE — TELEPHONE ENCOUNTER
Screening Questions  BLUE  KIND OF PREP RED  LOCATION [review exclusion criteria] GREEN  SEDATION TYPE        y Are you active on mychart?       [place] Ordering/Referring Provider?        [employer] What type of coverage do you have?      n Have you had a positive covid test in the last 14 days?     37.3 1. BMI  [BMI 40+ - review exclusion criteria]    y  2. Are you able to give consent for your medical care? [IF NO,RN REVIEW]        n  3. Are you taking any prescription pain medications on a routine schedule?      n  3a. EXTENDED PREP What kind of prescription?     n 4. Do you have any chemical dependencies such as alcohol, street drugs, or methadone?    y ptsd 5. Do you have any history of post-traumatic stress syndrome, severe anxiety or history of psychosis?      **If yes 3- 5 , please schedule with MAC sedation.**          IF YES TO ANY 6 - 10 - HOSPITAL SETTING ONLY.     n 6.   Do you need assistance transferring?     n 7.   Have you had a heart or lung transplant?    n 8.   Are you currently on dialysis?   n 9.   Do you use daily home oxygen?   n 10. Do you take nitroglycerin?   10a. n If yes, how often?     11. [FEMALES]  n Are you currently pregnant?    11a. n If yes, how many weeks? [ Greater than 12 weeks, OR NEEDED]    n 12. Do you have Pulmonary Hypertension? *NEED PAC APPT AT [facility]*     n 13. [review exclusion criteria]  Do you have any implantable devices in your body (pacemaker, defib, LVAD)?    n 14. In the past 6 months, have you had any heart related issues including cardiomyopathy or heart attack?     14a. n If yes, did it require cardiac stenting if so when?     n 15. Have you had a stroke or Transient ischemic attack (TIA - aka  mini stroke ) within 6 months?      n 16. Do you have mod to severe Obstructive Sleep Apnea?  [Hospital only - Ok at [place]]    n 17. Do you have SEVERE AND UNCONTROLLED asthma? *NEED PAC APPT AT [facility]*     n 18. Are you currently taking any blood thinners?     18a.  "If yes, inform patient to \"follow up w/ ordering provider for bridging instructions.\"    n 19. Do you take the medication Phentermine?    19a. If yes, \"Hold for 7 days before procedure.  Please consult your prescribing provider if you have questions about holding this medication.\"     n  20. Do you have chronic kidney disease?      n  21. Do you have a diagnosis of diabetes?     n  22. On a regular basis do you go 3-5 days between bowel movements?      23. Preferred LOCAL Pharmacy for Pre Prescription    [ LIST ONLY ONE PHARMACY]     [id number] - [place], [place] - [facility]        - CLOSING REMINDERS -    Informed patient they will need an adult    Cannot take any type of public or medical transportation alone    Conscious Sedation- Needs  for 6 hours after the procedure       MAC/General-Needs  for 24 hours after procedure    Pre-Procedure Covid test to be completed [[facility] PCR Testing Required]    Confirmed Nurse will call to complete assessment       - SCHEDULING DETAILS -  y Hospital Setting Required? If yes, what is the exclusion?: EUS   Lonnie  Surgeon    1/10/23  Date of Procedure  ENDOSCOPIC ULTRASOUND [EUS]  Type of Procedure Scheduled  [facility]       MAC Sedation Type     n PAC / Pre-op Required                 "

## 2022-12-29 NOTE — PROGRESS NOTES
Called to discuss with patient.     EGD/EUS with me with MAC in [facility].     If pt wants to discuss first, then can meet in clinic next available    Pt in agreement with planning for EUS with next available provider.    Explained they can expect a call from  for date and time of procedure, will need a , someone to stay with them for 24 hours and should stay in town for 24 hours (within 45 min of Hospital) post procedure    Patient needs to get pre-op physical completed. If outside [facility] system will need physical faxed to number [phone]   If you do not get a preop physical, your procedure could be cancelled, patient voiced understanding*    Preop Plan: MD to do on day of procedure    Med Review    Blood thinner -  none  ASA - none  Diabetic - none    Patient Education r/t procedure: mychart    Does patient have any history of gastric bypass/gastric surgery/altered panc/bili anatomy? None     NPO/Prep:   Adults and Children of all ages may consume solids up to 8 hours prior to arrival time - may consume clear liquids up to 1 hour prior to arrival time.    Verbalized understanding of all instructions. All questions answered.     Procedure order placed, message routed to Endo

## 2022-12-29 NOTE — TELEPHONE ENCOUNTER
I'm covering for Dr. Guidry starting this morning and have reviewed the imaging result on Ms. Aguirre.    IMPRESSION: 3.5 cm multicystic pancreatic tail mass with thin  enhancing septations. Differential diagnosis include mucinous cystic  neoplasm, complex IPMN, serous cystadenoma. Recommend strict follow-up  with gastroenterology for consideration of EUS/biopsy.  1.  Additional tiny cysts in the pancreatic tail measuring up to 2 mm;  favor side branch IPMNs. Recommend continued attention on follow-up.  3. Subcentimeter lesions in hepatic segments 7 and 6 are most  suggestive of hemangiomas.   4. Large gallstones measuring up to 4.3 cm.  5. 2.3 cm left adrenal gland nodule consistent with a benign lipid  rich adenoma.  6. Moderate hepatic steatosis.  7. Peripancreatic lymph nodes are likely reactive.  8. Pancreatic divisum.    I attempted to call her directly to discuss and left VM, am happy to speak with her directly today if call returned. Will also send My Chart message. I have placed a referral to GI for prompt evaluation and further plan of care.     Tonya Mark MD

## 2022-12-29 NOTE — TELEPHONE ENCOUNTER
[facility]     Referring provider: Tonya Mark MD    Referred to: [facility]     Provider Requested: none specified     Referral Received: 12/29/22     Records received: Epic    MR Pancreas 12/27/22  IMPRESSION: 3.5 cm multicystic pancreatic tail mass with thin enhancing septations. Differential diagnosis include mucinous cystic neoplasm, complex IPMN, serous cystadenoma. Recommend strict follow-up  with gastroenterology for consideration of EUS/biopsy.  1.  Additional tiny cysts in the pancreatic tail measuring up to 2 mm; favor side branch IPMNs. Recommend continued attention on follow-up.  3. Subcentimeter lesions in hepatic segments 7 and 6 are most suggestive of hemangiomas.   4. Large gallstones measuring up to 4.3 cm.  5. 2.3 cm left adrenal gland nodule consistent with a benign lipid rich adenoma.  6. Moderate hepatic steatosis.  7. Peripancreatic lymph nodes are likely reactive.  8. Pancreatic divisum.    CT chest/lung 11/11/22  Additional findings: 2.6 cm mass likely density along the pancreatic  body on series 3 image 304, potentially a gastric diverticulum versus  pancreatic mass. Heart size is normal without pericardial effusion. No  enlarged mediastinal or hilar lymph nodes. No acute or suspicious soft  tissue or osseous lesion     Images received: PACs    Evaluation for: Incidental finding of pancreatic mass. Radiology advises prompt evaluation.     Clinical History (per RN review):     MD review date:   MD Decision for clinic consultation/Orders:            Referral updates/Patient contacted:

## 2022-12-30 ENCOUNTER — TELEPHONE (OUTPATIENT)
Dept: GASTROENTEROLOGY | Facility: CLINIC | Age: 52
End: 2022-12-30

## 2022-12-30 NOTE — TELEPHONE ENCOUNTER
Pre assessment questions completed for upcoming upper endoscopic ultrasound (EUS) procedure scheduled on 1/10/23    COVID policy reviewed.     Pre-op scheduled  N/A    Reviewed procedural arrival time 0830 and facility location [facility]; [address] [place] [address]    Designated  policy reviewed. Instructed to have someone stay 24 hours post procedure.     Anticoagulation/blood thinners? No    Electronic implanted devices? No    Diabetic? No    Procedure indication: abnormal finding on GI/pancreas imaging    Reviewed procedure prep instructions.     Prep instructions previously sent via [employer].      Patient verbalized understanding and had no questions or concerns at this time.    Kimmy Noe, WU  Endoscopy Procedure Pre Assessment RN

## 2023-01-06 LAB — NONINV COLON CA DNA+OCC BLD SCRN STL QL: NEGATIVE

## 2023-01-09 ENCOUNTER — ANESTHESIA EVENT (OUTPATIENT)
Dept: GASTROENTEROLOGY | Facility: CLINIC | Age: 53
End: 2023-01-09
Payer: COMMERCIAL

## 2023-01-09 PROBLEM — K57.30 DIVERTICULOSIS OF LARGE INTESTINE WITHOUT HEMORRHAGE: Status: ACTIVE | Noted: 2023-01-09

## 2023-01-09 PROBLEM — K86.89 PANCREATIC MASS: Status: ACTIVE | Noted: 2023-01-09

## 2023-01-10 ENCOUNTER — APPOINTMENT (OUTPATIENT)
Dept: CT IMAGING | Facility: CLINIC | Age: 53
End: 2023-01-10
Attending: EMERGENCY MEDICINE
Payer: COMMERCIAL

## 2023-01-10 ENCOUNTER — HOSPITAL ENCOUNTER (EMERGENCY)
Facility: CLINIC | Age: 53
Discharge: HOME | End: 2023-01-11
Attending: EMERGENCY MEDICINE | Admitting: EMERGENCY MEDICINE
Payer: COMMERCIAL

## 2023-01-10 ENCOUNTER — HOSPITAL ENCOUNTER (OUTPATIENT)
Facility: CLINIC | Age: 53
Discharge: HOME | End: 2023-01-10
Attending: INTERNAL MEDICINE | Admitting: INTERNAL MEDICINE
Payer: COMMERCIAL

## 2023-01-10 ENCOUNTER — ANESTHESIA (OUTPATIENT)
Dept: GASTROENTEROLOGY | Facility: CLINIC | Age: 53
End: 2023-01-10
Payer: COMMERCIAL

## 2023-01-10 VITALS
OXYGEN SATURATION: 100 % | BODY MASS INDEX: 35.79 KG/M2 | SYSTOLIC BLOOD PRESSURE: 141 MMHG | HEART RATE: 69 BPM | RESPIRATION RATE: 16 BRPM | HEIGHT: 70 IN | WEIGHT: 250 LBS | TEMPERATURE: 98.3 F | DIASTOLIC BLOOD PRESSURE: 96 MMHG

## 2023-01-10 DIAGNOSIS — K86.2 PANCREAS CYST: Primary | ICD-10-CM

## 2023-01-10 DIAGNOSIS — R10.10 UPPER ABDOMINAL PAIN: ICD-10-CM

## 2023-01-10 DIAGNOSIS — K85.80 OTHER ACUTE PANCREATITIS WITHOUT INFECTION OR NECROSIS: ICD-10-CM

## 2023-01-10 DIAGNOSIS — R11.2 NAUSEA AND VOMITING, UNSPECIFIED VOMITING TYPE: ICD-10-CM

## 2023-01-10 LAB
ALBUMIN SERPL BCG-MCNC: 4.9 G/DL (ref 3.5–5.2)
ALBUMIN UR-MCNC: 30 MG/DL
ALP SERPL-CCNC: 94 U/L (ref 35–129)
ALT SERPL W P-5'-P-CCNC: 20 U/L (ref 10–50)
AMYLASE FLD-CCNC: >7500 U/L
ANION GAP SERPL CALCULATED.3IONS-SCNC: 22 MMOL/L (ref 7–15)
APPEARANCE UR: CLEAR
AST SERPL W P-5'-P-CCNC: 20 U/L (ref 10–50)
ATRIAL RATE - MUSE: 62 BPM
BASOPHILS # BLD AUTO: 0.1 10E3/UL (ref 0–0.2)
BASOPHILS NFR BLD AUTO: 1 %
BILIRUB SERPL-MCNC: 0.5 MG/DL
BILIRUB UR QL STRIP: NEGATIVE
BUN SERPL-MCNC: 13.1 MG/DL (ref 6–20)
CALCIUM SERPL-MCNC: 11 MG/DL (ref 8.6–10)
CEA FLD-MCNC: 2.6 NG/ML
CHLORIDE SERPL-SCNC: 102 MMOL/L (ref 98–107)
COLOR UR AUTO: YELLOW
CREAT SERPL-MCNC: 0.81 MG/DL (ref 0.51–1.17)
DEPRECATED HCO3 PLAS-SCNC: 18 MMOL/L (ref 22–29)
DIASTOLIC BLOOD PRESSURE - MUSE: NORMAL MMHG
EOSINOPHIL # BLD AUTO: 0.1 10E3/UL (ref 0–0.7)
EOSINOPHIL NFR BLD AUTO: 1 %
ERYTHROCYTE [DISTWIDTH] IN BLOOD BY AUTOMATED COUNT: 12.4 % (ref 10–15)
GFR SERPL CREATININE-BSD FRML MDRD: 87 ML/MIN/1.73M2
GLUCOSE SERPL-MCNC: 141 MG/DL (ref 70–99)
GLUCOSE UR STRIP-MCNC: NEGATIVE MG/DL
HCT VFR BLD AUTO: 47.1 % (ref 35–53)
HGB BLD-MCNC: 16 G/DL (ref 11.7–17.7)
HGB UR QL STRIP: ABNORMAL
HOLD SPECIMEN: NORMAL
HOLD SPECIMEN: NORMAL
IMM GRANULOCYTES # BLD: 0 10E3/UL
IMM GRANULOCYTES NFR BLD: 0 %
INTERPRETATION ECG - MUSE: NORMAL
KETONES UR STRIP-MCNC: 100 MG/DL
LEUKOCYTE ESTERASE UR QL STRIP: NEGATIVE
LIPASE SERPL-CCNC: 1400 U/L (ref 13–60)
LYMPHOCYTES # BLD AUTO: 2.5 10E3/UL (ref 0.8–5.3)
LYMPHOCYTES NFR BLD AUTO: 23 %
MCH RBC QN AUTO: 31 PG (ref 26.5–33)
MCHC RBC AUTO-ENTMCNC: 34 G/DL (ref 31.5–36.5)
MCV RBC AUTO: 91 FL (ref 78–100)
MONOCYTES # BLD AUTO: 0.5 10E3/UL (ref 0–1.3)
MONOCYTES NFR BLD AUTO: 5 %
MUCOUS THREADS #/AREA URNS LPF: PRESENT /LPF
NEUTROPHILS # BLD AUTO: 7.7 10E3/UL (ref 1.6–8.3)
NEUTROPHILS NFR BLD AUTO: 70 %
NITRATE UR QL: NEGATIVE
NRBC # BLD AUTO: 0 10E3/UL
NRBC BLD AUTO-RTO: 0 /100
P AXIS - MUSE: 29 DEGREES
PH UR STRIP: 5.5 [PH] (ref 5–7)
PLATELET # BLD AUTO: 364 10E3/UL (ref 150–450)
POTASSIUM SERPL-SCNC: 3.7 MMOL/L (ref 3.4–5.3)
PR INTERVAL - MUSE: 122 MS
PROT SERPL-MCNC: 8.5 G/DL (ref 6.4–8.3)
QRS DURATION - MUSE: 90 MS
QT - MUSE: 430 MS
QTC - MUSE: 436 MS
R AXIS - MUSE: -1 DEGREES
RBC # BLD AUTO: 5.16 10E6/UL (ref 3.8–5.9)
RBC URINE: 5 /HPF
SODIUM SERPL-SCNC: 142 MMOL/L (ref 136–145)
SP GR UR STRIP: 1.03 (ref 1–1.03)
SQUAMOUS EPITHELIAL: 1 /HPF
SYSTOLIC BLOOD PRESSURE - MUSE: NORMAL MMHG
T AXIS - MUSE: 21 DEGREES
TROPONIN T SERPL HS-MCNC: <6 NG/L
UROBILINOGEN UR STRIP-MCNC: NORMAL MG/DL
VENTRICULAR RATE- MUSE: 62 BPM
WBC # BLD AUTO: 10.9 10E3/UL (ref 4–11)
WBC URINE: 0 /HPF

## 2023-01-10 PROCEDURE — 370N000017 HC ANESTHESIA TECHNICAL FEE, PER MIN: Performed by: INTERNAL MEDICINE

## 2023-01-10 PROCEDURE — 84484 ASSAY OF TROPONIN QUANT: CPT | Performed by: EMERGENCY MEDICINE

## 2023-01-10 PROCEDURE — 71260 CT THORAX DX C+: CPT | Mod: 26 | Performed by: RADIOLOGY

## 2023-01-10 PROCEDURE — 250N000011 HC RX IP 250 OP 636: Performed by: EMERGENCY MEDICINE

## 2023-01-10 PROCEDURE — 258N000003 HC RX IP 258 OP 636: Performed by: EMERGENCY MEDICINE

## 2023-01-10 PROCEDURE — 93010 ELECTROCARDIOGRAM REPORT: CPT | Performed by: EMERGENCY MEDICINE

## 2023-01-10 PROCEDURE — 258N000003 HC RX IP 258 OP 636

## 2023-01-10 PROCEDURE — 250N000011 HC RX IP 250 OP 636

## 2023-01-10 PROCEDURE — 99285 EMERGENCY DEPT VISIT HI MDM: CPT | Mod: 25 | Performed by: EMERGENCY MEDICINE

## 2023-01-10 PROCEDURE — 96374 THER/PROPH/DIAG INJ IV PUSH: CPT | Mod: 59 | Performed by: EMERGENCY MEDICINE

## 2023-01-10 PROCEDURE — 93005 ELECTROCARDIOGRAM TRACING: CPT | Performed by: EMERGENCY MEDICINE

## 2023-01-10 PROCEDURE — 74177 CT ABD & PELVIS W/CONTRAST: CPT | Mod: 26 | Performed by: RADIOLOGY

## 2023-01-10 PROCEDURE — 88108 CYTOPATH CONCENTRATE TECH: CPT | Mod: 26 | Performed by: STUDENT IN AN ORGANIZED HEALTH CARE EDUCATION/TRAINING PROGRAM

## 2023-01-10 PROCEDURE — 74177 CT ABD & PELVIS W/CONTRAST: CPT

## 2023-01-10 PROCEDURE — 96361 HYDRATE IV INFUSION ADD-ON: CPT | Mod: 59 | Performed by: EMERGENCY MEDICINE

## 2023-01-10 PROCEDURE — 88313 SPECIAL STAINS GROUP 2: CPT | Mod: 26 | Performed by: STUDENT IN AN ORGANIZED HEALTH CARE EDUCATION/TRAINING PROGRAM

## 2023-01-10 PROCEDURE — 250N000009 HC RX 250

## 2023-01-10 PROCEDURE — 82150 ASSAY OF AMYLASE: CPT | Performed by: INTERNAL MEDICINE

## 2023-01-10 PROCEDURE — 81001 URINALYSIS AUTO W/SCOPE: CPT | Performed by: EMERGENCY MEDICINE

## 2023-01-10 PROCEDURE — 83690 ASSAY OF LIPASE: CPT | Performed by: EMERGENCY MEDICINE

## 2023-01-10 PROCEDURE — 88313 SPECIAL STAINS GROUP 2: CPT | Mod: TC | Performed by: INTERNAL MEDICINE

## 2023-01-10 PROCEDURE — 43238 EGD US FINE NEEDLE BX/ASPIR: CPT | Performed by: INTERNAL MEDICINE

## 2023-01-10 PROCEDURE — 82378 CARCINOEMBRYONIC ANTIGEN: CPT | Performed by: INTERNAL MEDICINE

## 2023-01-10 PROCEDURE — 80053 COMPREHEN METABOLIC PANEL: CPT | Performed by: EMERGENCY MEDICINE

## 2023-01-10 PROCEDURE — 96375 TX/PRO/DX INJ NEW DRUG ADDON: CPT | Performed by: EMERGENCY MEDICINE

## 2023-01-10 PROCEDURE — 43237 ENDOSCOPIC US EXAM ESOPH: CPT | Performed by: INTERNAL MEDICINE

## 2023-01-10 PROCEDURE — 85004 AUTOMATED DIFF WBC COUNT: CPT | Performed by: EMERGENCY MEDICINE

## 2023-01-10 PROCEDURE — 36415 COLL VENOUS BLD VENIPUNCTURE: CPT | Performed by: EMERGENCY MEDICINE

## 2023-01-10 RX ORDER — ONDANSETRON 4 MG/1
4 TABLET, FILM COATED ORAL EVERY 6 HOURS PRN
Qty: 18 TABLET | Refills: 0 | Status: SHIPPED | OUTPATIENT
Start: 2023-01-10 | End: 2023-01-11

## 2023-01-10 RX ORDER — NALOXONE HYDROCHLORIDE 0.4 MG/ML
0.2 INJECTION, SOLUTION INTRAMUSCULAR; INTRAVENOUS; SUBCUTANEOUS
Status: DISCONTINUED | OUTPATIENT
Start: 2023-01-10 | End: 2023-01-10 | Stop reason: HOSPADM

## 2023-01-10 RX ORDER — LEVOFLOXACIN 500 MG/1
500 TABLET, FILM COATED ORAL DAILY
Qty: 5 TABLET | Refills: 0 | Status: SHIPPED | OUTPATIENT
Start: 2023-01-11 | End: 2023-04-25

## 2023-01-10 RX ORDER — HYDROMORPHONE HYDROCHLORIDE 1 MG/ML
0.5 INJECTION, SOLUTION INTRAMUSCULAR; INTRAVENOUS; SUBCUTANEOUS
Status: COMPLETED | OUTPATIENT
Start: 2023-01-10 | End: 2023-01-10

## 2023-01-10 RX ORDER — PROPOFOL 10 MG/ML
INJECTION, EMULSION INTRAVENOUS PRN
Status: DISCONTINUED | OUTPATIENT
Start: 2023-01-10 | End: 2023-01-10

## 2023-01-10 RX ORDER — OXYCODONE HYDROCHLORIDE 5 MG/1
5 TABLET ORAL EVERY 6 HOURS PRN
Qty: 10 TABLET | Refills: 0 | Status: SHIPPED | OUTPATIENT
Start: 2023-01-10 | End: 2023-01-11

## 2023-01-10 RX ORDER — NALOXONE HYDROCHLORIDE 0.4 MG/ML
0.4 INJECTION, SOLUTION INTRAMUSCULAR; INTRAVENOUS; SUBCUTANEOUS
Status: DISCONTINUED | OUTPATIENT
Start: 2023-01-10 | End: 2023-01-10 | Stop reason: HOSPADM

## 2023-01-10 RX ORDER — HYDROMORPHONE HYDROCHLORIDE 1 MG/ML
0.5 INJECTION, SOLUTION INTRAMUSCULAR; INTRAVENOUS; SUBCUTANEOUS ONCE
Status: COMPLETED | OUTPATIENT
Start: 2023-01-10 | End: 2023-01-11

## 2023-01-10 RX ORDER — LIDOCAINE HYDROCHLORIDE 20 MG/ML
INJECTION, SOLUTION INFILTRATION; PERINEURAL PRN
Status: DISCONTINUED | OUTPATIENT
Start: 2023-01-10 | End: 2023-01-10

## 2023-01-10 RX ORDER — ONDANSETRON 2 MG/ML
8 INJECTION INTRAMUSCULAR; INTRAVENOUS ONCE
Status: COMPLETED | OUTPATIENT
Start: 2023-01-10 | End: 2023-01-10

## 2023-01-10 RX ORDER — SODIUM CHLORIDE, SODIUM LACTATE, POTASSIUM CHLORIDE, CALCIUM CHLORIDE 600; 310; 30; 20 MG/100ML; MG/100ML; MG/100ML; MG/100ML
INJECTION, SOLUTION INTRAVENOUS CONTINUOUS PRN
Status: DISCONTINUED | OUTPATIENT
Start: 2023-01-10 | End: 2023-01-10

## 2023-01-10 RX ORDER — IOPAMIDOL 755 MG/ML
135 INJECTION, SOLUTION INTRAVASCULAR ONCE
Status: COMPLETED | OUTPATIENT
Start: 2023-01-10 | End: 2023-01-10

## 2023-01-10 RX ORDER — FLUMAZENIL 0.1 MG/ML
0.2 INJECTION, SOLUTION INTRAVENOUS
Status: DISCONTINUED | OUTPATIENT
Start: 2023-01-10 | End: 2023-01-10 | Stop reason: HOSPADM

## 2023-01-10 RX ORDER — LIDOCAINE 40 MG/G
CREAM TOPICAL
Status: DISCONTINUED | OUTPATIENT
Start: 2023-01-10 | End: 2023-01-10 | Stop reason: HOSPADM

## 2023-01-10 RX ORDER — ONDANSETRON 4 MG/1
4 TABLET, ORALLY DISINTEGRATING ORAL EVERY 6 HOURS PRN
Status: DISCONTINUED | OUTPATIENT
Start: 2023-01-10 | End: 2023-01-10 | Stop reason: HOSPADM

## 2023-01-10 RX ORDER — LEVOFLOXACIN 5 MG/ML
INJECTION, SOLUTION INTRAVENOUS PRN
Status: DISCONTINUED | OUTPATIENT
Start: 2023-01-10 | End: 2023-01-10

## 2023-01-10 RX ORDER — ONDANSETRON 2 MG/ML
4 INJECTION INTRAMUSCULAR; INTRAVENOUS EVERY 6 HOURS PRN
Status: DISCONTINUED | OUTPATIENT
Start: 2023-01-10 | End: 2023-01-10 | Stop reason: HOSPADM

## 2023-01-10 RX ORDER — ONDANSETRON 2 MG/ML
4 INJECTION INTRAMUSCULAR; INTRAVENOUS ONCE
Status: COMPLETED | OUTPATIENT
Start: 2023-01-10 | End: 2023-01-11

## 2023-01-10 RX ADMIN — SODIUM CHLORIDE, POTASSIUM CHLORIDE, SODIUM LACTATE AND CALCIUM CHLORIDE: 600; 310; 30; 20 INJECTION, SOLUTION INTRAVENOUS at 10:28

## 2023-01-10 RX ADMIN — LIDOCAINE HYDROCHLORIDE 100 MG: 20 INJECTION, SOLUTION INFILTRATION; PERINEURAL at 10:30

## 2023-01-10 RX ADMIN — TOPICAL ANESTHETIC 1 EACH: 200 SPRAY DENTAL; PERIODONTAL at 10:27

## 2023-01-10 RX ADMIN — PROPOFOL 20 MG: 10 INJECTION, EMULSION INTRAVENOUS at 10:50

## 2023-01-10 RX ADMIN — PROPOFOL 175 MCG/KG/MIN: 10 INJECTION, EMULSION INTRAVENOUS at 10:41

## 2023-01-10 RX ADMIN — ONDANSETRON 8 MG: 2 INJECTION INTRAMUSCULAR; INTRAVENOUS at 19:43

## 2023-01-10 RX ADMIN — PROPOFOL 150 MCG/KG/MIN: 10 INJECTION, EMULSION INTRAVENOUS at 10:30

## 2023-01-10 RX ADMIN — SODIUM CHLORIDE 1000 ML: 9 INJECTION, SOLUTION INTRAVENOUS at 19:46

## 2023-01-10 RX ADMIN — PROPOFOL 40 MG: 10 INJECTION, EMULSION INTRAVENOUS at 10:40

## 2023-01-10 RX ADMIN — IOPAMIDOL 135 ML: 755 INJECTION, SOLUTION INTRAVENOUS at 21:57

## 2023-01-10 RX ADMIN — PROPOFOL 60 MG: 10 INJECTION, EMULSION INTRAVENOUS at 10:42

## 2023-01-10 RX ADMIN — LEVOFLOXACIN 500 MG: 5 INJECTION, SOLUTION INTRAVENOUS at 10:55

## 2023-01-10 RX ADMIN — PROPOFOL 60 MG: 10 INJECTION, EMULSION INTRAVENOUS at 10:38

## 2023-01-10 RX ADMIN — PROPOFOL 40 MG: 10 INJECTION, EMULSION INTRAVENOUS at 10:30

## 2023-01-10 RX ADMIN — PROPOFOL 200 MCG/KG/MIN: 10 INJECTION, EMULSION INTRAVENOUS at 11:00

## 2023-01-10 RX ADMIN — PROPOFOL 20 MG: 10 INJECTION, EMULSION INTRAVENOUS at 11:02

## 2023-01-10 RX ADMIN — HYDROMORPHONE HYDROCHLORIDE 0.5 MG: 1 INJECTION, SOLUTION INTRAMUSCULAR; INTRAVENOUS; SUBCUTANEOUS at 19:46

## 2023-01-10 ASSESSMENT — ACTIVITIES OF DAILY LIVING (ADL)
ADLS_ACUITY_SCORE: 35

## 2023-01-10 NOTE — ANESTHESIA CARE TRANSFER NOTE
Patient: Pavithra Aguirre    Procedure: Procedure(s):  ENDOSCOPIC ULTRASOUND, ESOPHAGOSCOPY / UPPER GASTROINTESTINAL TRACT (GI)       Diagnosis: Abnormal finding on GI tract imaging [R93.3]  Diagnosis Additional Information: No value filed.    Anesthesia Type:   No value filed.     Note:    Oropharynx: oropharynx clear of all foreign objects  Level of Consciousness: awake  Oxygen Supplementation: room air    Independent Airway: airway patency satisfactory and stable  Dentition: dentition unchanged  Vital Signs Stable: post-procedure vital signs reviewed and stable    Patient transferred to: Phase II    Handoff Report: Identifed the Patient, Identified the Reponsible Provider, Reviewed the pertinent medical history, Discussed the surgical course, Reviewed Intra-OP anesthesia mangement and issues during anesthesia, Set expectations for post-procedure period and Allowed opportunity for questions and acknowledgement of understanding      Vitals:  Vitals Value Taken Time   /71 01/10/23 1127   Temp     Pulse 84 01/10/23 1127   Resp     SpO2 97 % 01/10/23 1128   Vitals shown include unvalidated device data.    Electronically Signed By: HERNÁN Archer CRNA  January 10, 2023  11:30 AM

## 2023-01-10 NOTE — BRIEF OP NOTE
[facility]    Brief Operative Note  Pavithra Aguirre is a 52 year old Other with recently discovered multicystic pancreatic tail lesions who presents for an EUS evaluation. MRCP showed pancreas divisum, multicystic mass measuring 3.5 cm x 3.2 cm multicystic mass measuring in the pancreatic tail and multiple smaller cysts in the tail of the pancreas. There is a 2.3 cm x 1.1 cm left adrenal nodule. MRCP also showed a 4.3 cm stone in the gallbladder.  Pre-operative diagnosis: Abnormal finding on GI tract imaging [R93.3]  Post-operative diagnosis Same as pre-operative diagnosis    Procedure: Procedure(s):  ENDOSCOPIC ULTRASOUND, ESOPHAGOSCOPY / UPPER GASTROINTESTINAL TRACT (GI)  Surgeon: Surgeon(s) and Role:     * Thomas Fleming MD - Primary  Anesthesia: MAC   Estimated Blood Loss: None    Drains: None  Specimens:   ID Type Source Tests Collected by Time Destination   A :  Cyst Pancreas PANCREATIC CYST PANEL (INCLUDES CYTOLOGY) Thomas Fleming MD 1/10/2023 10:54 AM      Findings:     - LA Grade A esophagitis with no bleeding.    - Gastroesophageal flap valve classified as Hill Grade III (minimal fold, loose to endoscope, hiatal hernia likely).   - Normal stomach  - A single duodenal polyp.   - A cystic lesion with one large cyst with numerous smaller cysts resembling grape fruit was identified in the pancreatic tail. Fine needle aspiration for fluid performed and sent for pancreatic protocol.   - Pancreas divisum was visualized, otherwise, pancreatic parenchyma appeared normal.   - Endosonographic images of the left adrenal gland were unremarkable.   - The mediastinum was unremarkable endosonographically.  - Gallbladder contained large stone and sludge, no pericholecystic fluid collection   Complications: None.  Implants: * No implants in log *      Recommendations  - Disposition to home   - Await pancreatic protocol fluid analysis. If fluid analysis is  suggestive of mucinous cystic neoplasm, will refer for surgical evaluation  - The findings and recommendations were discussed with the patient and their family

## 2023-01-10 NOTE — DISCHARGE INSTRUCTIONS
Discharge Instructions after Endoscopic Ultrasound    Activity  You were given medicine for pain. You may be dizzy or sleepy.    For 24 hours:  Do not drive or use heavy equipment.  Do not make important decisions.  Do not drink any alcohol.    Diet  Wait one hour before eating or drinking. Start with sips of water. When your gag reflex has returned you  may go back to your usual diet, medicines and light exercise.    Discomfort  Some bloating is normal. You may have large burps or pass air.  You may have a sore throat for 2 to 3 days. It may help to:  Avoid hot liquids for 24 hours.  Use sore throat lozenges.  Gargle as needed with salt water up to 4 times a day. Mix 1 cup of warm water with 1 teaspoon of salt. Do not swallow.  You may take Tylenol (acetaminophen) for pain unless your doctor has told you not to.    Do not take aspirin or ibuprofen (Advil, Motrin, or other anti-inflammatory  drugs) for _____ days.    Follow-up  ___ We took small tissue or fluid samples to study. We will call you with the results in about 10 working days.    When to call    Call right away if you have:  Severe throat pain or trouble swallowing  Black stools (tar-like looking bowel movement)  Fever above 100.6 F (37.5 C)  Unusual pain in belly or chest not relieved by belching or passing air.    If you vomit blood or have severe pain, go to an emergency room.    If you have questions, call    Monday to Friday, 8 a.m. to 4:30 p.m.:   Central Scheduling Department: [phone]  After hours: Hospital: [phone] (Ask for the GI fellow on call)

## 2023-01-11 ENCOUNTER — TELEPHONE (OUTPATIENT)
Dept: GASTROENTEROLOGY | Facility: CLINIC | Age: 53
End: 2023-01-11

## 2023-01-11 VITALS
RESPIRATION RATE: 18 BRPM | DIASTOLIC BLOOD PRESSURE: 79 MMHG | HEART RATE: 80 BPM | WEIGHT: 250 LBS | HEIGHT: 70 IN | OXYGEN SATURATION: 96 % | SYSTOLIC BLOOD PRESSURE: 149 MMHG | TEMPERATURE: 97.6 F | BODY MASS INDEX: 35.79 KG/M2

## 2023-01-11 LAB
PATH REPORT.COMMENTS IMP SPEC: NORMAL
PATH REPORT.FINAL DX SPEC: NORMAL
PATH REPORT.GROSS SPEC: NORMAL
PATH REPORT.MICROSCOPIC SPEC OTHER STN: NORMAL
PATH REPORT.RELEVANT HX SPEC: NORMAL
UPPER EUS: NORMAL

## 2023-01-11 PROCEDURE — 96376 TX/PRO/DX INJ SAME DRUG ADON: CPT | Performed by: EMERGENCY MEDICINE

## 2023-01-11 PROCEDURE — 250N000011 HC RX IP 250 OP 636: Performed by: EMERGENCY MEDICINE

## 2023-01-11 RX ORDER — OXYCODONE HYDROCHLORIDE 5 MG/1
5 TABLET ORAL EVERY 6 HOURS PRN
Qty: 10 TABLET | Refills: 0 | Status: SHIPPED | OUTPATIENT
Start: 2023-01-11 | End: 2023-04-25

## 2023-01-11 RX ORDER — ONDANSETRON 4 MG/1
4 TABLET, FILM COATED ORAL EVERY 6 HOURS PRN
Qty: 18 TABLET | Refills: 0 | Status: SHIPPED | OUTPATIENT
Start: 2023-01-11 | End: 2023-04-25

## 2023-01-11 RX ADMIN — HYDROMORPHONE HYDROCHLORIDE 0.5 MG: 1 INJECTION, SOLUTION INTRAMUSCULAR; INTRAVENOUS; SUBCUTANEOUS at 00:22

## 2023-01-11 RX ADMIN — ONDANSETRON 4 MG: 2 INJECTION INTRAMUSCULAR; INTRAVENOUS at 00:22

## 2023-01-11 NOTE — DISCHARGE INSTRUCTIONS
Instructions from your doctor today:  Emergency Department testing is focused on the potential causes of your symptoms that are the most dangerous possibilities, and cannot cover every possibility. Based on the evaluation, it was deemed sufficiently safe to discharge and continue management through the clinics. Thus, follow-up is very important to assess for improvement/worsening, potential further testing, and potential treatment adjustments. If you were given opioid pain medications or other medications that can make you drowsy while in the ED, you should not drive for at least several hours and not until you feel completely back to normal.     Your tests are consistent with pancreatitis as a complication of your endoscopic ultrasound.  Drink fluids and avoid fatty foods for the next several days, gradually ease yourself back to normal diet as symptoms allow.    Please make an appointment to follow up with:  - Your Primary Care Provider in 2-4 days  - If you do not have a primary care provider, you can be seen in follow-up and establish care by calling any of the clinics below:     - [facility] (phone: [phone])     - [facility] (phone: [phone])   - Have your clinic provider review the results from today's visit with you again, including any potential follow-up or additional testing that may be needed based on the results. Occasionally, incidental findings are found on later review by radiologists that may need follow-up.     Return to the Emergency Department immediately if you have worsening symptoms, or any other urgent or potentially life-threatening concerns.

## 2023-01-11 NOTE — ED PROVIDER NOTES
History     Chief Complaint   Patient presents with     Post-op Problem     HPI  Pavithra Aguirre is a 52 year old adult with PMH notable for pancreatic tail lesions with US evaluation earlier in the day who presents to the ED with vomiting and abdominal pain.  Patient reports symptom onset about 1.5 hours prior to arrival.  She had felt fine after the procedure until then.  She has had many episodes of vomiting since start of symptoms.  Pain is primarily in the LUQ, radiates into the chest and toward the left scapula.  She feels somewhat short of breath.  No diarrhea, had a normal BM in the afternoon.  No fever..     Past Medical History  Past Medical History:   Diagnosis Date     Depressive disorder      Fibroadenoma of left breast     s/p lumpectomy   -Also pancreatic tail cyst and EUS performed earlier in the day      Past Surgical History:   Procedure Laterality Date     BREAST SURGERY      left breast surgery 2008     COLONOSCOPY N/A 10/22/2020    Procedure: COLONOSCOPY;  Surgeon: Iliana Faustin MD;  Location: [facility] OR     ORTHOPEDIC SURGERY      orthoscopic surgery in right knee 1990     ondansetron (ZOFRAN) 4 MG tablet  oxyCODONE (ROXICODONE) 5 MG tablet  buPROPion (WELLBUTRIN SR) 100 MG 12 hr tablet  [START ON 1/11/2023] levofloxacin (LEVAQUIN) 500 MG tablet  sertraline (ZOLOFT) 50 MG tablet      Allergies   Allergen Reactions     Erythromycin      Hydrocodone-Acetaminophen Other (See Comments)     Skin on tongue came off, and nausea/vomiting     Family History  Family History   Problem Relation Age of Onset     Depression/Anxiety Mother      Chronic Obstructive Pulmonary Disease Mother      Diabetes Father      Chemical Addiction Father      Depression/Anxiety Father      Diabetes Maternal Grandmother      Hypertension Maternal Grandmother      Diabetes Maternal Grandfather      Hypertension Maternal Grandfather      Breast Cancer Paternal Grandmother      Social History   Social History     Tobacco Use  "    Smoking status: Former     Packs/day: 0.25     Types: Cigarettes     Start date: 7/15/2015     Smokeless tobacco: Never   Vaping Use     Vaping Use: Never used   Substance Use Topics     Alcohol use: Not Currently     Alcohol/week: 0.0 standard drinks     Comment: ocassional     Drug use: Yes     Types: Marijuana     Comment: every once in awhile marijuana use.         A medically appropriate review of systems was performed with pertinent positives and negatives noted in the HPI, and all other systems negative.    Physical Exam   BP: (!) 140/114  Pulse: 74  Temp: 97.6  F (36.4  C)  Resp: 18  Height: 177.8 cm (5' 10\")  Weight: 113.4 kg (250 lb)  SpO2: 100 %    Physical Exam  General: Uncomfortable appearing, holding emesis bucket with bilious emesis inside. Appears stated age.   HENT: MMM, no oropharyngeal lesions  Eyes: PERRL, normal sclerae  Cardio: Regular rate. Regular rhythm. Extremities well perfused  Resp: Normal work of breathing, normal respiratory rate.  Abdomen: LUQ > LLQ > RUQ tenderness, non-distended, no rebound, no guarding  Neuro: alert and fully oriented. CN II-XII grossly intact. Grossly normal strength and sensation in all extremities.   MSK: no deformities. Grossly normal ROM in extremities.   Integumentary/Skin: no rash visualized, normal color  Psych: normal affect, normal behavior    ED Course      Procedures          Labs Ordered and Resulted from Time of ED Arrival to Time of ED Departure   COMPREHENSIVE METABOLIC PANEL - Abnormal       Result Value    Sodium 142      Potassium 3.7      Chloride 102      Carbon Dioxide (CO2) 18 (*)     Anion Gap 22 (*)     Urea Nitrogen 13.1      Creatinine 0.81      Calcium 11.0 (*)     Glucose 141 (*)     Alkaline Phosphatase 94      AST 20      ALT 20      Protein Total 8.5 (*)     Albumin 4.9      Bilirubin Total 0.5      GFR Estimate 87     LIPASE - Abnormal    Lipase 1,400 (*)    ROUTINE UA WITH MICROSCOPIC REFLEX TO CULTURE - Abnormal    Color " Urine Yellow      Appearance Urine Clear      Glucose Urine Negative      Bilirubin Urine Negative      Ketones Urine 100 (*)     Specific Gravity Urine 1.027      Blood Urine Small (*)     pH Urine 5.5      Protein Albumin Urine 30 (*)     Urobilinogen Urine Normal      Nitrite Urine Negative      Leukocyte Esterase Urine Negative      Mucus Urine Present (*)     RBC Urine 5 (*)     WBC Urine 0      Squamous Epithelials Urine 1     TROPONIN T, HIGH SENSITIVITY - Normal    Troponin T, High Sensitivity <6     CBC WITH PLATELETS AND DIFFERENTIAL    WBC Count 10.9      RBC Count 5.16      Hemoglobin 16.0      Hematocrit 47.1      MCV 91      MCH 31.0      MCHC 34.0      RDW 12.4      Platelet Count 364      % Neutrophils 70      % Lymphocytes 23      % Monocytes 5      % Eosinophils 1      % Basophils 1      % Immature Granulocytes 0      NRBCs per 100 WBC 0      Absolute Neutrophils 7.7      Absolute Lymphocytes 2.5      Absolute Monocytes 0.5      Absolute Eosinophils 0.1      Absolute Basophils 0.1      Absolute Immature Granulocytes 0.0      Absolute NRBCs 0.0       CT Chest/Abdomen/Pelvis w Contrast   Final Result   IMPRESSION:   1.  Mild emphysematous changes. Evidence of remote granulomatous disease. No suspicious adenopathy or pleural effusion on either side.      2.  Normal cardiac size. Mild coronary artery calcifications. No pericardial effusion. Normal caliber thoracic aorta.      3.  No free gas in the mediastinum. Exophytic hypodense lesion arising from the distal pancreatic body anteriorly, corresponding to recent MRI, unchanged. Small amount of soft tissue stranding adjacent to the pancreas and the stomach, likely on a    postoperative basis. Cholelithiasis. No free gas or mechanical bowel obstruction. Rectosigmoid diverticulosis without acute formation. Normal appendix.      4.  Small amount of dependent pelvic free fluid, likely physiologic.      5.  Mild right convex thoracic curve.             EKG  Interpretation:      Interpreted by Catalino Gardner MD  Time reviewed: 1935   Symptoms at time of EKG: chest pain   Rhythm: Normal sinus   Rate: Normal  Axis: Normal  Ectopy: None  Conduction: Normal  ST Segments/ T Waves: No ST-T wave changes and No acute ischemic changes  Q Waves: None  Comparison to prior: No old EKG available    Clinical Impression: normal EKG         Medical Decision Making  The patient presented with a problem that is an acute illness with systemic symptoms.    The patient's evaluation involved:  review of 1 prior external note(s) (Procedure note for US performed 1/10/2023)  ordering and review of 3+ test(s) (see separate area of note for details)  independent interpretation of testing performed by another health professional (see separate area of note for details)    The patient's management involved prescription drug management, a parenteral controlled substance and a decision regarding hospitalization.      Assessments & Plan (with Medical Decision Making)   Patient presenting with abdominal pain radiating into the chest, vomiting. Vitals in the ED unremarkable, exam with LUQ tenderness. Nursing notes reviewed. Initial differential diagnosis includes but not limited to gastritis, GI rupture related to procedure, ACS, pancreatitis.     Lipase elevated to 1400.  CT abdomen/pelvis demonstrates inflammation around the pancreas.  Patient had EUS of the pancreas earlier in the day.  Clinical findings consistent with acute pancreatitis secondary to procedural complication.    EKG shows normal sinus rhythm without evidence acute ischemia, high-sensitivity troponin T undetectably low.  ACS very unlikely. No LFT elevations to suggest hepatobiliary pathology. No peritoneal signs on exam to suggest peritonitis.     In the ED, the patient's symptoms were managed with ondansetron and hydromorphone, with improvement in symptoms upon reassessment.  NS bolus given.    Discussed pancreatitis diagnosis  with the patient.  Offered admission for symptom control.  Patient ultimately with adequate control at this time, and wishes to attempt outpatient management.  Counseled patient on clear liquid diet initially and gradual transition back to regular diet with low-fat.    The complete clinical picture is most consistent with acute pancreatitis as complication of EUS. After counseling on the diagnosis, work-up, and treatment plan, the patient was discharged to home. The patient was advised to follow-up with primary care in 2 to 4 days. The patient was advised to return to the ED if worsening symptoms, or if there are any urgent/life-threatening concerns.     Final diagnoses:   Other acute pancreatitis without infection or necrosis   Upper abdominal pain   Nausea and vomiting, unspecified vomiting type     New Prescriptions    ONDANSETRON (ZOFRAN) 4 MG TABLET    Take 1 tablet (4 mg) by mouth every 6 hours as needed for nausea or vomiting    OXYCODONE (ROXICODONE) 5 MG TABLET    Take 1 tablet (5 mg) by mouth every 6 hours as needed for severe pain (7-10)     --  Catalino Gardner MD   Emergency Medicine   [facility] EMERGENCY DEPARTMENT  1/10/2023     Catalino Gardner MD  01/10/23 3380

## 2023-01-11 NOTE — TELEPHONE ENCOUNTER
Briseyda:    Please arrange for MRI abdomen with contrast in 3 months. I would like this officially read prior to a clinic visit 1 week later.    Ind - pancreatic cyst surveillance.    YARON Fleming MD  Professor of Medicine  Division of Gastroenterology, Hepatology and Nutrition  [facility]

## 2023-01-11 NOTE — ED TRIAGE NOTES
Pt had an endoscopic ultrasound today. Got flushed, diaphoretic, short of breath, nauseous, pain on the left side of her abdomen and chest, vomiting.   VSS

## 2023-01-12 ENCOUNTER — PATIENT OUTREACH (OUTPATIENT)
Dept: GASTROENTEROLOGY | Facility: CLINIC | Age: 53
End: 2023-01-12

## 2023-01-12 DIAGNOSIS — K86.2 PANCREATIC CYST: Primary | ICD-10-CM

## 2023-01-12 NOTE — PROGRESS NOTES
Called pt as follow up to yesterday's EUS and subsequent ED visit. Left VM    Irena Hopper, RN, BSN,   [facility]  Care coordinator

## 2023-01-13 ENCOUNTER — MYC MEDICAL ADVICE (OUTPATIENT)
Dept: GASTROENTEROLOGY | Facility: CLINIC | Age: 53
End: 2023-01-13

## 2023-01-15 ASSESSMENT — LIFESTYLE VARIABLES: TOBACCO_USE: 1

## 2023-01-15 NOTE — ANESTHESIA PREPROCEDURE EVALUATION
Anesthesia Pre-Procedure Evaluation    Patient: Pavithra Aguirre   MRN: [id number] : 1970        Procedure : Procedure(s):  ENDOSCOPIC ULTRASOUND, ESOPHAGOSCOPY / UPPER GASTROINTESTINAL TRACT (GI)          Past Medical History:   Diagnosis Date     Depressive disorder      Fibroadenoma of left breast     s/p lumpectomy      Past Surgical History:   Procedure Laterality Date     BREAST SURGERY      left breast surgery      COLONOSCOPY N/A 10/22/2020    Procedure: COLONOSCOPY;  Surgeon: Iliana Faustin MD;  Location: [facility] OR     ENDOSCOPIC ULTRASOUND UPPER GASTROINTESTINAL TRACT (GI) N/A 1/10/2023    Procedure: ENDOSCOPIC ULTRASOUND, ESOPHAGOSCOPY / UPPER GASTROINTESTINAL TRACT (GI);  Surgeon: Thomas Fleming MD;  Location:  GI     ORTHOPEDIC SURGERY      orthoscopic surgery in right knee       Allergies   Allergen Reactions     Erythromycin      Hydrocodone-Acetaminophen Other (See Comments)     Skin on tongue came off, and nausea/vomiting      Social History     Tobacco Use     Smoking status: Former     Packs/day: 0.25     Types: Cigarettes     Start date: 7/15/2015     Smokeless tobacco: Never   Substance Use Topics     Alcohol use: Not Currently     Alcohol/week: 0.0 standard drinks     Comment: ocassional      Wt Readings from Last 1 Encounters:   01/10/23 113.4 kg (250 lb)        Anesthesia Evaluation   Pt has had prior anesthetic.     No history of anesthetic complications       ROS/MED HX  ENT/Pulmonary:     (+) tobacco use, Past use,     Neurologic:  - neg neurologic ROS     Cardiovascular:     (+) hypertension-----    METS/Exercise Tolerance: >4 METS    Hematologic:       Musculoskeletal:       GI/Hepatic: Comment: Pancreas mass   (-) GERD   Renal/Genitourinary:       Endo:       Psychiatric/Substance Use:     (+) psychiatric history depression     Infectious Disease:       Malignancy:       Other:            Physical Exam    Airway  airway exam normal           Respiratory Devices and  Support         Dental  no notable dental history         Cardiovascular   cardiovascular exam normal          Pulmonary   pulmonary exam normal                OUTSIDE LABS:  CBC:   Lab Results   Component Value Date    WBC 10.9 01/10/2023    WBC 8.8 02/05/2015    HGB 16.0 01/10/2023    HGB 13.0 02/05/2015    HCT 47.1 01/10/2023    HCT 39.3 02/05/2015     01/10/2023     02/05/2015     BMP:   Lab Results   Component Value Date     01/10/2023     02/05/2015    POTASSIUM 3.7 01/10/2023    POTASSIUM 4.2 02/05/2015    CHLORIDE 102 01/10/2023    CHLORIDE 106 02/05/2015    CO2 18 (L) 01/10/2023    CO2 29 02/05/2015    BUN 13.1 01/10/2023    BUN 12 02/05/2015    CR 0.81 01/10/2023    CR 0.65 02/05/2015     (H) 01/10/2023    GLC 97 02/05/2015     COAGS: No results found for: PTT, INR, FIBR  POC:   Lab Results   Component Value Date    HCG Negative 10/22/2020     HEPATIC:   Lab Results   Component Value Date    ALBUMIN 4.9 01/10/2023    PROTTOTAL 8.5 (H) 01/10/2023    ALT 20 01/10/2023    AST 20 01/10/2023    ALKPHOS 94 01/10/2023    BILITOTAL 0.5 01/10/2023     OTHER:   Lab Results   Component Value Date    A1C 5.2 02/05/2015    LI 11.0 (H) 01/10/2023    LIPASE 1,400 (H) 01/10/2023    TSH 0.77 02/05/2015       Anesthesia Plan    ASA Status:  2   NPO Status:  NPO Appropriate    Anesthesia Type: MAC.     - Reason for MAC: straight local not clinically adequate   Induction: Intravenous.   Maintenance: TIVA.        Consents    Anesthesia Plan(s) and associated risks, benefits, and realistic alternatives discussed. Questions answered and patient/representative(s) expressed understanding.    - Discussed:     - Discussed with:  Patient         Postoperative Care       PONV prophylaxis: Ondansetron (or other 5HT-3)     Comments:                Chin Barraza MD

## 2023-01-15 NOTE — ANESTHESIA POSTPROCEDURE EVALUATION
Patient: Pavithra Aguirre    Procedure: Procedure(s):  ENDOSCOPIC ULTRASOUND, ESOPHAGOSCOPY / UPPER GASTROINTESTINAL TRACT (GI)       Anesthesia Type:  MAC    Note:  Disposition: Outpatient   Postop Pain Control: Uneventful            Sign Out: Well controlled pain   PONV: No   Neuro/Psych: Uneventful            Sign Out: Acceptable/Baseline neuro status   Airway/Respiratory: Uneventful            Sign Out: Acceptable/Baseline resp. status   CV/Hemodynamics: Uneventful            Sign Out: Acceptable CV status; No obvious hypovolemia; No obvious fluid overload   Other NRE: NONE   DID A NON-ROUTINE EVENT OCCUR? No           Last vitals:  Vitals Value Taken Time   /79 01/11/23 0015   Temp 36.4  C (97.6  F) 01/10/23 1914   Pulse 80 01/11/23 0015   Resp 18 01/10/23 1914   SpO2 96 % 01/11/23 0015       Electronically Signed By: Chin Barraza MD  January 15, 2023  8:52 AM

## 2023-01-25 ENCOUNTER — TELEPHONE (OUTPATIENT)
Dept: GASTROENTEROLOGY | Facility: CLINIC | Age: 53
End: 2023-01-25
Payer: COMMERCIAL

## 2023-04-10 ENCOUNTER — ANCILLARY PROCEDURE (OUTPATIENT)
Dept: MRI IMAGING | Facility: CLINIC | Age: 53
End: 2023-04-10
Attending: INTERNAL MEDICINE
Payer: COMMERCIAL

## 2023-04-10 DIAGNOSIS — K86.2 PANCREATIC CYST: ICD-10-CM

## 2023-04-10 PROCEDURE — 74183 MRI ABD W/O CNTR FLWD CNTR: CPT | Mod: GC | Performed by: RADIOLOGY

## 2023-04-10 PROCEDURE — A9585 GADOBUTROL INJECTION: HCPCS | Performed by: RADIOLOGY

## 2023-04-10 RX ORDER — GADOBUTROL 604.72 MG/ML
15 INJECTION INTRAVENOUS ONCE
Status: COMPLETED | OUTPATIENT
Start: 2023-04-10 | End: 2023-04-10

## 2023-04-10 RX ADMIN — GADOBUTROL 11 ML: 604.72 INJECTION INTRAVENOUS at 07:51

## 2023-04-18 ENCOUNTER — DOCUMENTATION ONLY (OUTPATIENT)
Dept: GASTROENTEROLOGY | Facility: CLINIC | Age: 53
End: 2023-04-18
Payer: COMMERCIAL

## 2023-04-18 NOTE — PROGRESS NOTES
Called PT and left VM.    Called to remind patient of their upcoming appointment with our GI clinic, on 04/24/23 at 8:40 AM  with Dr. Corky Fleming. This appointment is scheduled as a video visit. You will receive a call approximately 30 minutes prior to check you in, you must be in [place] for this visit., if your appointment is virtual (video or telephone) you need to be in [place] for the visit. To reschedule or cancel patient to call [phone].      SK

## 2023-04-21 NOTE — PROGRESS NOTES
Called PT and left VM.     Called to remind patient of their upcoming appointment with our GI clinic, on 04/24/23 at 8:40 AM  with Dr. Corky Fleming. This appointment is scheduled as a video visit. You will receive a call approximately 30 minutes prior to check you in, you must be in [place] for this visit., if your appointment is virtual (video or telephone) you need to be in [place] for the visit. To reschedule or cancel patient to call [phone].        SK

## 2023-04-24 ENCOUNTER — PATIENT OUTREACH (OUTPATIENT)
Dept: ONCOLOGY | Facility: CLINIC | Age: 53
End: 2023-04-24

## 2023-04-24 ENCOUNTER — VIRTUAL VISIT (OUTPATIENT)
Dept: GASTROENTEROLOGY | Facility: CLINIC | Age: 53
End: 2023-04-24
Attending: INTERNAL MEDICINE
Payer: COMMERCIAL

## 2023-04-24 ENCOUNTER — TELEPHONE (OUTPATIENT)
Dept: GASTROENTEROLOGY | Facility: CLINIC | Age: 53
End: 2023-04-24
Payer: COMMERCIAL

## 2023-04-24 DIAGNOSIS — K86.2 PANCREATIC CYST: Primary | ICD-10-CM

## 2023-04-24 DIAGNOSIS — K76.0 HEPATIC STEATOSIS: ICD-10-CM

## 2023-04-24 DIAGNOSIS — K80.20 GALLBLADDER STONE WITHOUT CHOLECYSTITIS OR OBSTRUCTION: ICD-10-CM

## 2023-04-24 PROCEDURE — 99214 OFFICE O/P EST MOD 30 MIN: CPT | Mod: VID | Performed by: INTERNAL MEDICINE

## 2023-04-24 NOTE — TELEPHONE ENCOUNTER
Briseyda:    Pt seen in clinic today for large pancreatic cyst.   Prior EUS 1/10/23. MRI now.    Please:  1) Arrange for surgical oncology consultation. Ind - unexplained pancreatic tail cyst > 3 cm in diameter.  2) Arrange for repeat EUS with me with MAC in [facility] in 3 months.   3) Place on schedule for panc tumor conference for a date after the surg onc consult visit (once we have date for that).    YARON Fleming MD  Professor of Medicine  Division of Gastroenterology, Hepatology and Nutrition  [facility]

## 2023-04-24 NOTE — NURSING NOTE
Is the patient currently in the state of [place]? YES    Visit mode:VIDEO    If the visit is dropped, the patient can be reconnected by: VIDEO VISIT: Text to cell phone: [phone]    Will anyone else be joining the visit? NO      How would you like to obtain your AVS? MyChart    Are changes needed to the allergy or medication list? NO    Reason for visit: Video Visit     Michele Sánchez Facilitator

## 2023-04-24 NOTE — PATIENT INSTRUCTIONS
You will find a brief summary of your discussion and care plan from today's visit below.  Dr Fleming has outlined the following steps after your recent clinic visit:    Recommendations:  - Surgical oncology consultation ordered. You will receive a call from the oncology scheduling department to set up this consult.  - Repeat EUS (endoscopic ultrasound) procedure with Dr Fleming at the [facility] in 3 months. You will get a call from Endoscopy/GI scheduling to arrrange this procedure for July 2023. If you have not heard from them please call [phone], option 2.    Please call with any questions or concerns regarding your clinic visit today.     It is a pleasure being involved in your health care.     Contacts post-consultation depending on your need:     Schedule Clinic Appointments                        [phone], option #5   M-F 7:30 - 5 pm    Briseyda Hopper, RN Care Coordinator           [phone]    Missy De La Rosa RN Care Coordinator              [phone]     Britta Roldan, RN Care Coordinator          [phone]     GI Procedure Scheduling                               [phone], option 2     For urgent/emergent questions after business hours, you may reach the on-call GI Fellow by contacting the [facility]  at [phone].     How do I schedule labs, imaging studies, or procedures that were ordered in clinic today?      Labs: To schedule lab appointment at the Clinic and Surgery Center, use my chart or call [phone]. If you have a [place] lab closer to home where you are regularly seen you can give them a call.      Procedures: If a colonoscopy, upper endoscopy, breath test, esophageal manometry, or pH impedence was ordered today, our endoscopy team will call you to schedule this. If you have not heard from our endoscopy team within a week, please call [phone] to schedule.      Imaging Studies: If you were scheduled for a CT scan, X-ray, MRI, ultrasound,  HIDA scan or other imaging study, please call [phone] to have this scheduled.      Referral: If a referral to another specialty was ordered, expect a phone call or follow instructions above. If you have not heard from anyone regarding your referral in a week, please call our clinic to check the status.      How to I schedule a follow-up visit?  If you did not schedule a follow-up visit today, please call [phone] option #5 to schedule a follow-up office visit.      I recommend signing up for [employer] access if you have not already done so and are comfortable with using a computer.  This allows for online access to your lab results and also helps you communicate efficiently with the clinic should any questions arise in your care.

## 2023-04-24 NOTE — PROGRESS NOTES
New Patient Oncology Nurse Navigator Note     Referring provider: Dr. Fleming     Referring Clinic/Organization: [facility]     Referred to: Hepatobiliary Surgery      Requested provider (if applicable): First available provider    Referral Received: 04/24/23       Evaluation for : Pancreas Cyst      Clinical History (per Nurse review of records provided):      See book marked documents:       Referring MD office note    Pathology report    Imaging reports     Procedure report       Allergies   Allergen Reactions     Erythromycin      Hydrocodone-Acetaminophen Other (See Comments)     Skin on tongue came off, and nausea/vomiting        Past Medical History:   Diagnosis Date     Depressive disorder      Fibroadenoma of left breast     s/p lumpectomy       Past Surgical History:   Procedure Laterality Date     BREAST SURGERY      left breast surgery 2008     COLONOSCOPY N/A 10/22/2020    Procedure: COLONOSCOPY;  Surgeon: Iliana Faustin MD;  Location: [facility] OR     ENDOSCOPIC ULTRASOUND UPPER GASTROINTESTINAL TRACT (GI) N/A 1/10/2023    Procedure: ENDOSCOPIC ULTRASOUND, ESOPHAGOSCOPY / UPPER GASTROINTESTINAL TRACT (GI);  Surgeon: Thomas Fleming MD;  Location:  GI     ORTHOPEDIC SURGERY      orthoscopic surgery in right knee 1990       Current Outpatient Medications   Medication Sig Dispense Refill     buPROPion (WELLBUTRIN SR) 100 MG 12 hr tablet Take 1 tablet (100 mg) by mouth 2 times daily Take by mouth once daily for 2 weeks, then twice daily 180 tablet 3     levofloxacin (LEVAQUIN) 500 MG tablet Take 1 tablet (500 mg) by mouth daily (Patient not taking: Reported on 4/24/2023) 5 tablet 0     ondansetron (ZOFRAN) 4 MG tablet Take 1 tablet (4 mg) by mouth every 6 hours as needed for nausea or vomiting 18 tablet 0     oxyCODONE (ROXICODONE) 5 MG tablet Take 1 tablet (5 mg) by mouth every 6 hours as needed for severe pain (7-10) (Patient not taking: Reported on 4/24/2023) 10 tablet 0     sertraline  (ZOLOFT) 50 MG tablet Take 1 tablet (50 mg) by mouth daily 90 tablet 3        Patient Active Problem List   Diagnosis     Elevated blood pressure     Depression with anxiety     Skin lesion of right ear     Diverticulosis of large intestine without hemorrhage     Pancreatic mass         Clinical Assessment / Barriers to Care (Per Nurse):    None at this time.     Records Location:     [facility]     Records Needed:     NONE AT THIS TIME      Additional testing needed prior to consult:     NONE AT THIS TIME    Referral updates and Plan:       Consult with Surgical Oncology     Diana Forrest RN, BSN   Surgical Oncology New Patient Nurse Navigator  [facility]  [phone]

## 2023-04-24 NOTE — LETTER
4/24/2023         RE: Pavithra Aguirre  [address]  [place] [address]        Dear Colleague,    Thank you for referring your patient, Pavithra Aguirre, to the [facility]. Please see a copy of my visit note below.      [facility]  GASTROENTEROLOGY PROGRESS NOTE  Pavithra Aguirre [id number]     SUBJECTIVE:  52 yo female being seen for pancreatic cyst surveillance.   Initially found incidentally on CT chest (for cancer screening) 11/11/22.     Subsequent MRI 12/27/22 showed the lesion to be cystic.    IMPRESSION: 3.5 cm multicystic pancreatic tail mass with thin  enhancing septations. Differential diagnosis include mucinous cystic  neoplasm, complex IPMN, serous cystadenoma. Recommend strict follow-up  with gastroenterology for consideration of EUS/biopsy.  1.  Additional tiny cysts in the pancreatic tail measuring up to 2 mm;  favor side branch IPMNs. Recommend continued attention on follow-up.  3. Subcentimeter lesions in hepatic segments 7 and 6 are most  suggestive of hemangiomas.   4. Large gallstones measuring up to 4.3 cm.  5. 2.3 cm left adrenal gland nodule consistent with a benign lipid  rich adenoma.  6. Moderate hepatic steatosis.  7. Peripancreatic lymph nodes are likely reactive.  8. Pancreatic divisum.    She was referred for further evaluation. Due to the large size, EUS was recommended and this was performed 1/10/23. This showed:  Impression:            - A cystic lesion with one large cyst with numerous                          smaller internal cysts was identified in the                          pancreatic tail. Fine needle aspiration of the largest                          compartment for fluid performed and sent for                          pancreatic protocol analysis.                          - Pancreas divisum was visualized, otherwise,                          pancreatic parenchyma appeared normal.                          - Gallbladder contained large  "stone and sludge, no                          pericholecystic fluid collection                          - Endosonographic images of the left adrenal gland                          were unremarkable.                          - The mediastinum was unremarkable endosonographically.                          - LA Grade A esophagitis with no bleeding.                          - Gastroesophageal flap valve classified as Hill Grade                          III (minimal fold, loose to endoscope, hiatal hernia                          likely).                          - Normal stomach.                          The EUS appearance of the cyst is unusual with one                          large dominant cavity and internal smaller \"daughter                          cysts\". No cysts are seen in other regions of the                          pancreas. No solid filling defects or debris was seen                          in the cyst. DIfferential diagnosis includes mucinous                          cystic neoplasm or perhaps serous cystadenoma.                          The small 2-3 mm tail cysts mentioned on MRI were not                          seen by this EUS.     Cytology was negative, amylase in the fluid was >7500 and fluid CEA was low at 2.6.    She developed pancreatitis the evening of the EUS and was seen in the ED. I was not contacted at that time, however she was felt to be stable and was not admitted. CT showed mild stranding in the region of the cyst. Lipase was elevated at 1400.  She was not tachycardic and BUN and WBC were normal.    I recommended MRI in 3 months. This was performed 4/10/23 and we are meeting to discuss this and further plans. MRI showed:  IMPRESSION:  1. No significant change in the multicystic debris-filled lesion  arising from the anterior pancreatic tail. The previously noted  enhancing components are not well visualized.  2. Less conspicuous additional subcentimeter cystic foci along the  main " pancreatic duct, likely small side-branch IPMNs.  3. Cholelithiasis.    Hepatic steatosis was again mentioned in the report. Of note, LFTs were normal 1/10/23.    She is feeling well. The pancreatitis pain lasted 3 days. She occasionally reports mild discomfort in the left abdomen after fatty foods. This has resolved with dietary restrictions. She has rare loose stools but no persistent diarrhea. She has lost weight subjectively but not weighed herself. This has been intentional in response to changes in her diet and increased exercise.    She has never had pancreatitis other than the post-EUS episode. She does not drink alcohol now and has no history of heavy ingestion. She quit smoking in 2018 but started at age 16.   There is no FH of pancreatic cancer. Her father had chronic pancreatitis and drank heavily.    OBJECTIVE:  VS: There were no vitals taken for this visit.   GEN: A&Ox3, NAD, comfortable  Anicteric, no jaundice.    IMPRESSION:  Pavithra Aguirre is a 53 year old adult seen for surveillance of incidentally-identified pancreatic cysts (a cluster of cysts w larger dominant cyst).      1) Pancreatic cystic lesion:  The etiology is unclear. The low CEA would argue against IPMN or MCN and the elevated amylase would argue against serous cystadenoma and would be unlikely in MCN.    The imaging and chemical analysis could be consistent with pseudocyst, however she has no history of acute pancreatitis and neither the EUS nor MRI suggested chronic pancreatitis.    Given that the cyst measured > 3 cm and did not resolve after aspiration, at this point surgical consultation is recommended per international consensus guidelines. This was discussed with the pt. In my experience, our surgeons will likely recommend surveillance given the lack of high-risk features other than size, especially in light of the low fluid CEA. As such, I will tentatively schedule a 3 month follow-up MRI.    Given the prior post-EUS  pancreatitis, we discussed that I will avoid repeat sampling unless there are significant changes. [place] biopsy could otherwise be considered but will again risk pancreatitis and is relatively low yield in my experience.    2) Steatosis on imaging. LFTs normal. Denies alcohol. No further evaluation recommended.    3) Gallbladder stone. Discussed that no furher evaluation or management is indicated in the absence of symptoms.     RECOMMENDATIONS:  See above.    It was a pleasure to participate in the care of this patient; please contact us with any further questions.  A total of 39 minutes was spent on the day of the visit, >50% of which was counseling regarding the above delineated issues. The remainder was review of records and imaging as well as documentation and coordination of care.              Again, thank you for allowing me to participate in the care of your patient.      Sincerely,    Thomas Fleming MD

## 2023-04-25 ENCOUNTER — OFFICE VISIT (OUTPATIENT)
Dept: FAMILY MEDICINE | Facility: CLINIC | Age: 53
End: 2023-04-25
Payer: COMMERCIAL

## 2023-04-25 VITALS
DIASTOLIC BLOOD PRESSURE: 88 MMHG | TEMPERATURE: 100.9 F | OXYGEN SATURATION: 98 % | HEART RATE: 93 BPM | SYSTOLIC BLOOD PRESSURE: 123 MMHG

## 2023-04-25 DIAGNOSIS — K57.30 DIVERTICULOSIS OF LARGE INTESTINE WITHOUT HEMORRHAGE: ICD-10-CM

## 2023-04-25 DIAGNOSIS — R50.9 FEVER, UNSPECIFIED FEVER CAUSE: ICD-10-CM

## 2023-04-25 DIAGNOSIS — K86.89 PANCREATIC MASS: ICD-10-CM

## 2023-04-25 DIAGNOSIS — R10.32 ABDOMINAL PAIN, LLQ: Primary | ICD-10-CM

## 2023-04-25 LAB
ALBUMIN UR-MCNC: NEGATIVE MG/DL
APPEARANCE UR: CLEAR
BACTERIA #/AREA URNS HPF: ABNORMAL /HPF
BILIRUB UR QL STRIP: NEGATIVE
COLOR UR AUTO: YELLOW
GLUCOSE UR STRIP-MCNC: NEGATIVE MG/DL
HGB UR QL STRIP: ABNORMAL
KETONES UR STRIP-MCNC: 15 MG/DL
LEUKOCYTE ESTERASE UR QL STRIP: ABNORMAL
NITRATE UR QL: NEGATIVE
PH UR STRIP: 6 [PH] (ref 5–8)
RBC #/AREA URNS AUTO: ABNORMAL /HPF
SP GR UR STRIP: 1.01 (ref 1–1.03)
SQUAMOUS #/AREA URNS AUTO: ABNORMAL /LPF
UROBILINOGEN UR STRIP-ACNC: 2 E.U./DL
WBC #/AREA URNS AUTO: ABNORMAL /HPF

## 2023-04-25 PROCEDURE — 81001 URINALYSIS AUTO W/SCOPE: CPT | Performed by: STUDENT IN AN ORGANIZED HEALTH CARE EDUCATION/TRAINING PROGRAM

## 2023-04-25 PROCEDURE — 99214 OFFICE O/P EST MOD 30 MIN: CPT | Mod: GC | Performed by: STUDENT IN AN ORGANIZED HEALTH CARE EDUCATION/TRAINING PROGRAM

## 2023-04-25 NOTE — PROGRESS NOTES
Preceptor Attestation:   Patient seen, evaluated and discussed with the resident. I have verified the content of the note, which accurately reflects my assessment of the patient and the plan of care.   Supervising Physician:  Pavithra Cobos MD

## 2023-04-25 NOTE — PROGRESS NOTES
"  Assessment & Plan     Abdominal pain, LLQ  Fever, unspecified fever cause  Pancreatic mass  Diverticulosis of large intestine without hemorrhage  52 y/o patient with hx of unspecified pancreatic mass (workup ongoing; records reviewed) and sigmoid diverticulosis on 2020 colonoscopy presents with 2 days lower abdominal pain, pain with urination, diarrhea, fever/chills. Pain does not feel like prior pancreatic pain and exam demonstrates lower abdominal tenderness, LLQ most tender. Differential includes UTI, diverticulitis, viral gastroenteritis, other lower GI inflammation ovarian issue. Gynecologic/STI etiology seems less likely based on symptoms, lack of STI exposure, no vaginal discharge/bleeding. UA completed here negative -- will treat like uncomplicated diverticulitis vs viral gastroenteritis with bowel rest, antiinflammatories, and check in on Mychart in 2 days to assess symptoms.   - UA Microscopic with Reflex to Culture      No follow-ups on file.    Vero Guerra MD  [facility] SHANIA Cunningham is a 53 year old, presenting for the following health issues:  Abdominal Pain (Pt reports having lower abdominal pain, low-grade fever, and cramping for a couple of days. Pt states it got worse yesterday and she has been having back pain with some \"muscular pain\" when trying to urinate. Pt reports tylenol helps for some time.)        4/25/2023     4:39 PM   Additional Questions   Roomed by jaxon helms   Accompanied by self         4/25/2023     4:39 PM   Patient Reported Additional Medications   Patient reports taking the following new medications Tylenol     HPI     They/them    Middle low abd pain  Initially was more generalized, thought it was period cramps or constipation  Worsened yesterday, stabbing pain  Felt incredibly tired  Low fever 99 degrees, feels chills  Got up to urinate would hurt, like a strain  But not a burning pain  Feels different than pancreas pain  Not " as bad today  Hadn't told GI doc yesterday about it; appt was early at like 830 and the pain worsened at 11  No vomiting, not nauseous   Most worried about relation to pancreas  Hasn't eaten since last night  Has diarrhea, sometimes soft, sometimes watery  No cramping with diarrhea. stooling brings relief. Not constipated before  Perimenopausal, - periods are regularish still  No vaginal discharge   Has same sex partner, long term relationship  Had a UTI in college but none since  No gyn history   Has had cervical polyps removed before long time ago        Objective    /88   Pulse 93   Temp (!) 100.9  F (38.3  C) (Oral)   LMP 04/04/2023   SpO2 98%   There is no height or weight on file to calculate BMI.  Physical Exam  Constitutional:       General: Marisa Aguirre is not in acute distress.     Appearance: Marisa Aguirre is not toxic-appearing or diaphoretic.   Eyes:      Conjunctiva/sclera: Conjunctivae normal.   Cardiovascular:      Pulses: Normal pulses.   Pulmonary:      Effort: Pulmonary effort is normal.   Abdominal:      General: There is no distension.      Palpations: Abdomen is soft.      Tenderness: There is abdominal tenderness (lower quadrants, L>R). There is no guarding or rebound.   Skin:     General: Skin is warm and dry.   Neurological:      Mental Status: Marisa Aguirre is alert.            Results for orders placed or performed in visit on 04/25/23   UA reflex to Microscopic and Culture     Status: Abnormal    Specimen: Urine, Clean Catch   Result Value Ref Range    Color Urine Yellow Colorless, Straw, Light Yellow, Yellow    Appearance Urine Clear Clear    Glucose Urine Negative Negative mg/dL    Bilirubin Urine Negative Negative    Ketones Urine 15 (A) Negative mg/dL    Specific Gravity Urine 1.010 1.005 - 1.030    Blood Urine Moderate (A) Negative    pH Urine 6.0 5.0 - 8.0    Protein Albumin Urine Negative Negative mg/dL    Urobilinogen Urine 2.0 (A) 0.2, 1.0 E.U./dL    Nitrite Urine Negative  Negative    Leukocyte Esterase Urine Small (A) Negative   UA Microscopic with Reflex to Culture     Status: Abnormal   Result Value Ref Range    Bacteria Urine Few (A) None Seen /HPF    RBC Urine 0-2 0-2 /HPF /HPF    WBC Urine 0-5 0-5 /HPF /HPF    Squamous Epithelials Urine Few (A) None Seen /LPF    Narrative    Urine Culture not indicated       ----- Service Performed and Documented by Resident or Fellow ------

## 2023-04-25 NOTE — PATIENT INSTRUCTIONS
Nice to meet you today, Marisa!    Sorry you're feeling bad.     Here are the diagnoses I am thinking about: UTI, diverticulitis (does not require antibiotics) and viral gastroenteritis.    We will treat your symptoms by encouraging liquid diet (including soup broths) and treating pain with tylenol and ibuprofen.    Tips:  - take iburpofen with food/liquids if able  - do not exceed 4000 mg tylenol per day  - alternate each med every 2-4 hours - write it down to keep track what you've taken  - take 500-1000 mg tylenol, and 200-400 mg ibuprofen. Alter dose depending on the frequency you're taking things    I'll check in on [employer] on Thursday 4/27, and we'll make a plan from there.   Urine test will come back overnight

## 2023-04-26 NOTE — TELEPHONE ENCOUNTER
RECORDS STATUS - ALL OTHER DIAGNOSIS      RECORDS RECEIVED FROM: [facility]   DATE RECEIVED: 4/26   NOTES STATUS DETAILS   OFFICE NOTE from referring provider [facility] Thomas Fleming MD: 4/24/23   DISCHARGE SUMMARY from hospital [facility] 1/10/23   DISCHARGE REPORT from the ER [facility] 1/10/23   OPERATIVE REPORT Epic 1/10/23: EUS  10/22/20: Colonoscopy   MEDICATION LIST [facility]    LABS     PATHOLOGY REPORTS [facility] 1/10/23: Cytology   COLOGARD CE - [employer] 12/30/22   ANYTHING RELATED TO DIAGNOSIS Epic 4/25/23   IMAGING (NEED IMAGES & REPORT)     CT SCANS PACS 1/10/23, 11/11/22: [facility]   MRI PACS 4/10/23, 12/27/22: Epic

## 2023-05-01 DIAGNOSIS — K86.89 PANCREATIC MASS: ICD-10-CM

## 2023-05-01 DIAGNOSIS — R50.9 FEVER, UNSPECIFIED FEVER CAUSE: ICD-10-CM

## 2023-05-01 DIAGNOSIS — R10.32 ABDOMINAL PAIN, LEFT LOWER QUADRANT: Primary | ICD-10-CM

## 2023-05-01 DIAGNOSIS — R19.7 DIARRHEA, UNSPECIFIED TYPE: ICD-10-CM

## 2023-05-01 NOTE — PROGRESS NOTES
"Surgical Oncology - New Patient  5/2/2023    53 F w/ a 3+ cm cyst of the tail of the pancreas.  This was originally seen incidentally on cross sectional imaging (lung cancer screening) and further worked up and followed by GI.  The cyst is multi-septated with smaller internal daughter cysts confirmed on EUS from January 2023.  No clear pancreatic duct communication on imaging but cyst fluid analysis on FNA was elevated > 7500.  Low CEA and no presence of mucin.  No history of prior pancreatitis other than the pancreatitis she got after the FNA sampling.  Given these findings, she was sent for surgical oncology consultation.  She now presents for recommendations on management.    Of Note: She is relatively healthy.  No prior abdominal operations.  No prior pancreatitis.  No blood thinning medications.  She has been having left sided abdominal pain the past week (PCP thinks diverticulitis).  Appetite has been low an the patient had a fever at one point.    BP (!) 147/108   Pulse 67   Temp 98.8  F (37.1  C) (Oral)   Resp 16   Ht 1.778 m (5' 10\")   Wt 109.9 kg (242 lb 4.8 oz)   LMP 04/04/2023   SpO2 98%   BMI 34.77 kg/m      MRI/MRCP (12/27/2022): IMPRESSION:   1. 3.5 cm multicystic pancreatic tail mass with thin enhancing septations. Differential diagnosis include mucinous cystic neoplasm, complex IPMN, serous cystadenoma. Recommend strict follow-up with gastroenterology for consideration of EUS/biopsy.  2. Additional tiny cysts in the pancreatic tail measuring up to 2 mm; favor side branch IPMNs. Recommend continued attention on follow-up.  3. Subcentimeter lesions in hepatic segments 7 and 6 are most suggestive of hemangiomas.   4. Large gallstones measuring up to 4.3 cm.  5. 2.3 cm left adrenal gland nodule consistent with a benign lipid rich adenoma.  6. Moderate hepatic steatosis.  7. Peripancreatic lymph nodes are likely reactive.  8. Pancreatic divisum.    EUS (1/10/2023): IMPRESSION:              - A " "cystic lesion (3.2 cm) with one large cyst with numerous smaller internal cysts was identified in the pancreatic tail. Fine needle aspiration of the largest compartment for fluid performed and sent for pancreatic protocol analysis.   - Pancreas divisum was visualized, otherwise, pancreatic parenchyma appeared normal.   - Gallbladder contained large stone and sludge, no pericholecystic fluid collection   - Endosonographic images of the left adrenal gland were unremarkable.   - The mediastinum was unremarkable endosonographically.   - LA Grade A esophagitis with no bleeding.   - Gastroesophageal flap valve classified as Hill Grade III (minimal fold, loose to endoscope, hiatal hernia likely).   - Normal stomach.   - The EUS appearance of the cyst is unusual with one large dominant cavity and internal smaller \"daughter cysts\". No cysts are seen in other regions of the pancreas. No solid filling defects or debris was seen in the cyst. DIfferential diagnosis includes mucinous cystic neoplasm or perhaps serous cystadenoma. The small 2-3 mm tail cysts mentioned on MRI were not seen by this EUS.    FNA (1/10/2023): Negative for intraepithelial lesion or malignancy.  Mucicarmine stain negative for intra or extra-cellular mucin.  CEA 2.6.  Amylase > 7500.    MRI/MRCP (4/10/2023): IMPRESSION:  1. No significant change in the multicystic debris-filled lesion arising from the anterior pancreatic tail. The previously noted enhancing components are not well visualized.  2. Less conspicuous additional subcentimeter cystic foci along the main pancreatic duct, likely small side-branch IPMNs.  3. Cholelithiasis.    Labs today, essentially normal.    Assessment/Plan:  53 F w/ a 3+ cm cyst of the tail of the pancreas.  Cyst fluid analysis with elevated amylase and low CEA.  The appearance of the cyst is atypical and the patient has no prior history of pancreatitis.  Thus it is of indeterminate etiology.  There are no clear worrisome or " high risk features other than the size of the cyst (worrisome feature for size > 3 cm).  Given these findings, the differential is broad and includes benign and pre-cancerous cysts.  We discussed different types of pancreatic cysts.  The most worrisome are mucinous cysts given malignancy potential, but it is not clear that she has a mucinous cyst (but it is still possible).  If she did have a mucinous cyst, the risk of malignant degeneration depends on the presence of worrisome or high risk features.  She would only have a single worrisome feature given size > 3 cm.  Thus, the malignant potential would likely be on the lower side if this cyst was mucinous.  It is also possible that she has a serous cyst or pseudocyst.  As such, I have recommended ongoing surveillance at this time.  She is already set up for repeat surveillance with Dr. Fleming in 3 months.  I could see her back if there were any worrisome changes in the cyst.  I will also communicate the plan with Dr. Fleming.  In terms of her abdominal discomfort, labs today (CBC, CMP, amylase, lipase) did not show concerning findings and she will follow-up further with her PCP regarding these symptoms.  We did discuss what surgery would entail if we get to that point in the future (distal pancreatectomy +/- splenectomy).  We discussed brief long term risks of diabetes, hyposplenism, and exocrine insufficiency.  The patient was in understanding.  Questions were answered and the patient was in agreement with and understanding of the plan.    A total of 45 minutes were spent on this encounter including more than 50% in face to face time and the remainder in imaging review, chart review, documentation, and coordination of care.

## 2023-05-02 ENCOUNTER — ONCOLOGY VISIT (OUTPATIENT)
Dept: SURGERY | Facility: CLINIC | Age: 53
End: 2023-05-02
Attending: INTERNAL MEDICINE
Payer: COMMERCIAL

## 2023-05-02 ENCOUNTER — PRE VISIT (OUTPATIENT)
Dept: SURGERY | Facility: CLINIC | Age: 53
End: 2023-05-02
Payer: COMMERCIAL

## 2023-05-02 VITALS
DIASTOLIC BLOOD PRESSURE: 108 MMHG | BODY MASS INDEX: 34.69 KG/M2 | HEIGHT: 70 IN | HEART RATE: 67 BPM | WEIGHT: 242.3 LBS | RESPIRATION RATE: 16 BRPM | TEMPERATURE: 98.8 F | SYSTOLIC BLOOD PRESSURE: 147 MMHG | OXYGEN SATURATION: 98 %

## 2023-05-02 DIAGNOSIS — K86.89 PANCREATIC MASS: ICD-10-CM

## 2023-05-02 DIAGNOSIS — K86.2 PANCREAS CYST: Primary | ICD-10-CM

## 2023-05-02 DIAGNOSIS — K86.2 PANCREATIC CYST: ICD-10-CM

## 2023-05-02 DIAGNOSIS — K86.89 PANCREATIC MASS: Primary | ICD-10-CM

## 2023-05-02 LAB
ALBUMIN SERPL BCG-MCNC: 4.3 G/DL (ref 3.5–5.2)
ALP SERPL-CCNC: 98 U/L (ref 35–129)
ALT SERPL W P-5'-P-CCNC: 17 U/L (ref 10–50)
AMYLASE SERPL-CCNC: 68 U/L (ref 28–100)
ANION GAP SERPL CALCULATED.3IONS-SCNC: 9 MMOL/L (ref 7–15)
AST SERPL W P-5'-P-CCNC: 14 U/L (ref 10–50)
BASOPHILS # BLD AUTO: 0.1 10E3/UL (ref 0–0.2)
BASOPHILS NFR BLD AUTO: 1 %
BILIRUB SERPL-MCNC: 0.2 MG/DL
BUN SERPL-MCNC: 8.2 MG/DL (ref 6–20)
CALCIUM SERPL-MCNC: 10.3 MG/DL (ref 8.6–10)
CHLORIDE SERPL-SCNC: 102 MMOL/L (ref 98–107)
CREAT SERPL-MCNC: 0.8 MG/DL (ref 0.51–1.17)
DEPRECATED HCO3 PLAS-SCNC: 27 MMOL/L (ref 22–29)
EOSINOPHIL # BLD AUTO: 0.3 10E3/UL (ref 0–0.7)
EOSINOPHIL NFR BLD AUTO: 3 %
ERYTHROCYTE [DISTWIDTH] IN BLOOD BY AUTOMATED COUNT: 12.3 % (ref 10–15)
GFR SERPL CREATININE-BSD FRML MDRD: 88 ML/MIN/1.73M2
GLUCOSE SERPL-MCNC: 96 MG/DL (ref 70–99)
HCT VFR BLD AUTO: 38.8 % (ref 35–53)
HGB BLD-MCNC: 13.1 G/DL (ref 11.7–17.7)
IMM GRANULOCYTES # BLD: 0 10E3/UL
IMM GRANULOCYTES NFR BLD: 0 %
LIPASE SERPL-CCNC: 26 U/L (ref 13–60)
LYMPHOCYTES # BLD AUTO: 2 10E3/UL (ref 0.8–5.3)
LYMPHOCYTES NFR BLD AUTO: 22 %
MCH RBC QN AUTO: 30.8 PG (ref 26.5–33)
MCHC RBC AUTO-ENTMCNC: 33.8 G/DL (ref 31.5–36.5)
MCV RBC AUTO: 91 FL (ref 78–100)
MONOCYTES # BLD AUTO: 0.6 10E3/UL (ref 0–1.3)
MONOCYTES NFR BLD AUTO: 7 %
NEUTROPHILS # BLD AUTO: 6.3 10E3/UL (ref 1.6–8.3)
NEUTROPHILS NFR BLD AUTO: 67 %
NRBC # BLD AUTO: 0 10E3/UL
NRBC BLD AUTO-RTO: 0 /100
PLATELET # BLD AUTO: 412 10E3/UL (ref 150–450)
POTASSIUM SERPL-SCNC: 4.6 MMOL/L (ref 3.4–5.3)
PROT SERPL-MCNC: 7.9 G/DL (ref 6.4–8.3)
RBC # BLD AUTO: 4.25 10E6/UL (ref 3.8–5.9)
SODIUM SERPL-SCNC: 138 MMOL/L (ref 136–145)
WBC # BLD AUTO: 9.3 10E3/UL (ref 4–11)

## 2023-05-02 PROCEDURE — 99212 OFFICE O/P EST SF 10 MIN: CPT | Performed by: SURGERY

## 2023-05-02 PROCEDURE — 83690 ASSAY OF LIPASE: CPT | Performed by: SURGERY

## 2023-05-02 PROCEDURE — 99204 OFFICE O/P NEW MOD 45 MIN: CPT | Performed by: SURGERY

## 2023-05-02 PROCEDURE — 36415 COLL VENOUS BLD VENIPUNCTURE: CPT | Performed by: SURGERY

## 2023-05-02 PROCEDURE — 80053 COMPREHEN METABOLIC PANEL: CPT | Performed by: SURGERY

## 2023-05-02 PROCEDURE — 85025 COMPLETE CBC W/AUTO DIFF WBC: CPT | Performed by: SURGERY

## 2023-05-02 PROCEDURE — 82150 ASSAY OF AMYLASE: CPT | Performed by: SURGERY

## 2023-05-02 ASSESSMENT — PAIN SCALES - GENERAL: PAINLEVEL: MILD PAIN (2)

## 2023-05-02 NOTE — NURSING NOTE
"Oncology Rooming Note    May 2, 2023 11:22 AM   Pavithra Aguirre is a 53 year old adult who presents for:    Chief Complaint   Patient presents with     Oncology Clinic Visit     New Eval  for Pancreatic Cyst     Initial Vitals: Blood Pressure (Abnormal) 147/108   Pulse 67   Temperature 98.8  F (37.1  C) (Oral)   Respiration 16   Height 1.778 m (5' 10\")   Weight 109.9 kg (242 lb 4.8 oz)   Last Menstrual Period 04/04/2023   Oxygen Saturation 98%   Body Mass Index 34.77 kg/m   Estimated body mass index is 34.77 kg/m  as calculated from the following:    Height as of this encounter: 1.778 m (5' 10\").    Weight as of this encounter: 109.9 kg (242 lb 4.8 oz). Body surface area is 2.33 meters squared.  Mild Pain (2) Comment: Data Unavailable   Patient's last menstrual period was 04/04/2023.  Allergies reviewed: Yes  Medications reviewed: Yes    Medications: Medication refills not needed today.  Pharmacy name entered into [address]: "[id number] - [place], [place] - [address] [place] [address] AT [address]    Clinical concerns: none       Valeria Morrison MA            "

## 2023-05-02 NOTE — NURSING NOTE
Venipuncture blood draw done on patients Right ac. Patient tolerated well without any complications. 21G needle used. Pt last name and  verified on specimen label and chart. Specimen sent to first floor lab. See flowsheets      Janette Mathews CMA on 2023 at 12:09 PM

## 2023-05-02 NOTE — LETTER
"    5/2/2023         RE: Pavithra Aguirre  [address]  [facility] [address]        Dear Colleague,    Thank you for referring your patient, Pavithra Aguirre, to the [facility]. Please see a copy of my visit note below.    Surgical Oncology - New Patient  5/2/2023    53 F w/ a 3+ cm cyst of the tail of the pancreas.  This was originally seen incidentally on cross sectional imaging (lung cancer screening) and further worked up and followed by GI.  The cyst is multi-septated with smaller internal daughter cysts confirmed on EUS from January 2023.  No clear pancreatic duct communication on imaging but cyst fluid analysis on FNA was elevated > 7500.  Low CEA and no presence of mucin.  No history of prior pancreatitis other than the pancreatitis she got after the FNA sampling.  Given these findings, she was sent for surgical oncology consultation.  She now presents for recommendations on management.    Of Note: She is relatively healthy.  No prior abdominal operations.  No prior pancreatitis.  No blood thinning medications.  She has been having left sided abdominal pain the past week (PCP thinks diverticulitis).  Appetite has been low an the patient had a fever at one point.    BP (!) 147/108   Pulse 67   Temp 98.8  F (37.1  C) (Oral)   Resp 16   Ht 1.778 m (5' 10\")   Wt 109.9 kg (242 lb 4.8 oz)   LMP 04/04/2023   SpO2 98%   BMI 34.77 kg/m      MRI/MRCP (12/27/2022): IMPRESSION:   1. 3.5 cm multicystic pancreatic tail mass with thin enhancing septations. Differential diagnosis include mucinous cystic neoplasm, complex IPMN, serous cystadenoma. Recommend strict follow-up with gastroenterology for consideration of EUS/biopsy.  2. Additional tiny cysts in the pancreatic tail measuring up to 2 mm; favor side branch IPMNs. Recommend continued attention on follow-up.  3. Subcentimeter lesions in hepatic segments 7 and 6 are most suggestive of hemangiomas.   4. Large gallstones measuring up " "to 4.3 cm.  5. 2.3 cm left adrenal gland nodule consistent with a benign lipid rich adenoma.  6. Moderate hepatic steatosis.  7. Peripancreatic lymph nodes are likely reactive.  8. Pancreatic divisum.    EUS (1/10/2023): IMPRESSION:              - A cystic lesion (3.2 cm) with one large cyst with numerous smaller internal cysts was identified in the pancreatic tail. Fine needle aspiration of the largest compartment for fluid performed and sent for pancreatic protocol analysis.   - Pancreas divisum was visualized, otherwise, pancreatic parenchyma appeared normal.   - Gallbladder contained large stone and sludge, no pericholecystic fluid collection   - Endosonographic images of the left adrenal gland were unremarkable.   - The mediastinum was unremarkable endosonographically.   - LA Grade A esophagitis with no bleeding.   - Gastroesophageal flap valve classified as Hill Grade III (minimal fold, loose to endoscope, hiatal hernia likely).   - Normal stomach.   - The EUS appearance of the cyst is unusual with one large dominant cavity and internal smaller \"daughter cysts\". No cysts are seen in other regions of the pancreas. No solid filling defects or debris was seen in the cyst. DIfferential diagnosis includes mucinous cystic neoplasm or perhaps serous cystadenoma. The small 2-3 mm tail cysts mentioned on MRI were not seen by this EUS.    FNA (1/10/2023): Negative for intraepithelial lesion or malignancy.  Mucicarmine stain negative for intra or extra-cellular mucin.  CEA 2.6.  Amylase > 7500.    MRI/MRCP (4/10/2023): IMPRESSION:  1. No significant change in the multicystic debris-filled lesion arising from the anterior pancreatic tail. The previously noted enhancing components are not well visualized.  2. Less conspicuous additional subcentimeter cystic foci along the main pancreatic duct, likely small side-branch IPMNs.  3. Cholelithiasis.    Labs today, essentially normal.    Assessment/Plan:  53 F w/ a 3+ cm cyst of " the tail of the pancreas.  Cyst fluid analysis with elevated amylase and low CEA.  The appearance of the cyst is atypical and the patient has no prior history of pancreatitis.  Thus it is of indeterminate etiology.  There are no clear worrisome or high risk features other than the size of the cyst (worrisome feature for size > 3 cm).  Given these findings, the differential is broad and includes benign and pre-cancerous cysts.  We discussed different types of pancreatic cysts.  The most worrisome are mucinous cysts given malignancy potential, but it is not clear that she has a mucinous cyst (but it is still possible).  If she did have a mucinous cyst, the risk of malignant degeneration depends on the presence of worrisome or high risk features.  She would only have a single worrisome feature given size > 3 cm.  Thus, the malignant potential would likely be on the lower side if this cyst was mucinous.  It is also possible that she has a serous cyst or pseudocyst.  As such, I have recommended ongoing surveillance at this time.  She is already set up for repeat surveillance with Dr. Fleming in 3 months.  I could see her back if there were any worrisome changes in the cyst.  I will also communicate the plan with Dr. Fleming.  In terms of her abdominal discomfort, labs today (CBC, CMP, amylase, lipase) did not show concerning findings and she will follow-up further with her PCP regarding these symptoms.  We did discuss what surgery would entail if we get to that point in the future (distal pancreatectomy +/- splenectomy).  We discussed brief long term risks of diabetes, hyposplenism, and exocrine insufficiency.  The patient was in understanding.  Questions were answered and the patient was in agreement with and understanding of the plan.    A total of 45 minutes were spent on this encounter including more than 50% in face to face time and the remainder in imaging review, chart review, documentation, and coordination of  care.        Armando Daley MD

## 2023-05-05 ENCOUNTER — TELEPHONE (OUTPATIENT)
Dept: GASTROENTEROLOGY | Facility: CLINIC | Age: 53
End: 2023-05-05
Payer: COMMERCIAL

## 2023-05-05 NOTE — TELEPHONE ENCOUNTER
"Briseyda Sparks Pt has seen Dr. Daley who is recommending continued surveillance.     I believe we have already planned for a follow-up EUS.  When this is ordered, could you please add \"with PancraGen testing\" to the order? Will serve as reminder for me.    YARON Fleming MD  Professor of Medicine  Division of Gastroenterology, Hepatology and Nutrition  [facility]    "

## 2023-05-22 ENCOUNTER — TELEPHONE (OUTPATIENT)
Dept: GASTROENTEROLOGY | Facility: CLINIC | Age: 53
End: 2023-05-22
Payer: COMMERCIAL

## 2023-05-22 ENCOUNTER — HOSPITAL ENCOUNTER (OUTPATIENT)
Facility: CLINIC | Age: 53
End: 2023-05-22
Attending: INTERNAL MEDICINE | Admitting: INTERNAL MEDICINE
Payer: COMMERCIAL

## 2023-05-22 NOTE — TELEPHONE ENCOUNTER
Irean Hopper, RN  P Endoscopy Scheduling Pool  Hi team     Please schedule EUS with MAC with Dr Lonnie brito in mid to late July. Order is placed.     Thank you   Briseyda   1ST ATTEMPT TO SCHEDULE

## 2023-05-22 NOTE — TELEPHONE ENCOUNTER
Screening Questions  BLUE  KIND OF PREP RED  LOCATION [review exclusion criteria] GREEN  SEDATION TYPE        Y Are you active on mychart?       Thomas Fleming MD   Ordering/Referring Provider?        [employer] What type of coverage do you have?      N Have you had a positive covid test in the last 14 days?     35.1 1. BMI  [BMI 40+ - review exclusion criteria& smart-phrase document]    Y  2. Are you able to give consent for your medical care? [IF NO,RN REVIEW]          N  3. Are you taking any prescription pain medications on a routine schedule   (ex narcotics: oxycodone, roxicodone, oxycontin,  and percocet)? [RN Review]          3a. EXTENDED PREP What kind of prescription?     N 4. Do you have any chemical dependencies such as alcohol, street drugs, or methadone?        **If yes 3- 5 , please schedule with MAC sedation.**          IF YES TO ANY 6 - 10 - HOSPITAL SETTING ONLY.     N 6.   Do you need assistance transferring?     N 7.   Have you had a heart or lung transplant?    N 8.   Are you currently on dialysis?   N 9.   Do you use daily home oxygen?   N 10. Do you take nitroglycerin?   10a.  If yes, how often?     N 11. Are you currently pregnant?    11a.  If yes, how many weeks? [ Greater than 12 weeks, OR NEEDED]    N 12. Do you have Pulmonary Hypertension? *NEED PAC APPT AT [facility] w/ [facility]*     N 13. [review exclusion criteria]  Do you have any implantable devices in your body (pacemaker, defib, LVAD)?    N 14. In the past 6 months, have you had any heart related issues including cardiomyopathy or heart attack?     14a.  If yes, did it require cardiac stenting if so when?     N 15. Have you had a stroke or Transient ischemic attack (TIA - aka  mini stroke ) within 6 months?      N 16. Do you have mod to severe Obstructive Sleep Apnea?  [Hospital only]    N 17. Do you have SEVERE AND UNCONTROLLED asthma? *NEED PAC APPT AT [facility] w/[facility]*     18.Do you take blood thinners?  No    N 19. Do you take any of the  "following medications?    N Phentermine    N Ozempic    N Wegovy (Semaglutide)      19a. If yes, \"Hold for 7 days before procedure.  Please consult your prescribing provider if you have questions about holding this medication.\"     N  20. Do you have chronic kidney disease?      N  21. Do you have a diagnosis of diabetes?     N  22. On a regular basis do you go 3-5 days between bowel movements?      23. Preferred LOCAL Pharmacy for Pre Prescription        [id number] - [place], [place] - [address] [facility] AT [address]      - CLOSING REMINDERS -    You will receive a call from a Nurse to review instructions and health history.  This assessment must be completed prior to your procedure.  Failure to complete the Nurse assessment may result in the procedure being cancelled.      On the day of your procedure, please designatean adult(s) who can drive you home stay with you for the next 24 hours. The medicines used in the exam will make you sleepy. You will not be able to drive.      You cannot take public transportation, ride share services, or non-medical taxi service without a responsible caregiver.  Medical transport services are allowed with the requirement that a responsible caregiver will receive you at your destination.  We require that drivers and caregivers are confirmed prior to your procedure.      - SCHEDULING DETAILS -  YES & ONLY AT [facility] Hospital Setting Required & If yes, what is the exclusion?   ANJALI  Surgeon    7/25  Date of Procedure  ENDOSCOPIC ULTRASOUND [EUS]  Type of Procedure Scheduled  [facility] Location   EUS PREPWhich Colonoscopy Prep was Sent?     MAC, PER ORDER Sedation Type     N PAC / Pre-op Required                 "

## 2023-06-12 ENCOUNTER — TUMOR CONFERENCE (OUTPATIENT)
Dept: ONCOLOGY | Facility: CLINIC | Age: 53
End: 2023-06-12
Payer: COMMERCIAL

## 2023-06-26 NOTE — H&P
Nutrition Assessment   Reason for Consult/Assessment: Follow up, Parenteral nutritionConsult for enteral nutrition support     Diagnosis and Hx: Reviewed    Pertinent Nutrition History: recently diagnosed head and neck cancer s/p resection, bone graft and trach/DHT placement. Wt loss over the past 2 months, was taking ~800-1200kcal per day from liquid supplements prior to admission         Diet Order: TPN/PPN                  Diet tolerance: Tolerating nutrition support   Food Allergies: None known    Demographic/Anthropometrics Information  Gender: male   Patient Age: 70 year old  Height:   Ht Readings from Last 1 Encounters:   23 5' 7.32\" (1.71 m)      Weight:   Wt Readings from Last 1 Encounters:   23 48.3 kg      BMI:   BMI Readings from Last 1 Encounters:   23 16.52 kg/m²       Usual Weight: 54.5 kg  % Weight Change: 20% wt loss over ~3 months  Weight change significant: Yes  Reason for weight change: Decreased intake     Estimated Needs:  Calorie calculation weight: 43.3 kg   Weight used: Admit      Calorie needs: 30-35 kcal/kg   Calculated Energy Needs: 6224-3162  kcal     Protein Calculation Weight: 43.3 kg   Calculation weight used: Admit      Protein needs: 1.2 - 2.0 g/kg   Calculated protein needs: 52-87  g     Calculated Fluid Needs: Per Provider           NFPE  Body Fat  Overall Body Fat: Severe  Muscle Mass  Overall Muscle Mass: Severe               Intervention: Coordination of nutrition care by a nutrition professional, Parenteral nutrition/IV fluids            Enteral Nutrition Formula: Jevity 1.2 Calorie   Rate: trickle feeds of 20 ml/hr x 24hrs  Access Site: G-tube  Calories Provided by Tube Feedin kcals  Protein Provided by Tube Feedin g pro  Free Water Provided by Tube Feedin ml            Goal rate: 50 ml/hr  Flushes: minimum 30ml q4h and before/after meds     Access Site: PIV   Lipids Provided by Parenteral Nutrition: 250 ml 20% lipids  Protein Provided by  Pavithra Aguirre  [phone]  adult  52 year old      Reason for procedure/surgery: Pavithra Aguirre is a 52 year old Other with recently discovered multicystic pancreatic tail lesions who presents for an EUS evaluation. MRCP showed pancreas divisum, multicystic mass measuring 3.5 cm x 3.2 cm multicystic mass measuring in the pancreatic tail and multiple smaller cysts in the tail of the pancreas. There is a 2.3 cm x 1.1 cm left adrenal nodule. MRCP also showed a 4.3 cm stone in the gallbladder.    Former smoker and quit about 10 years ago. Denies any alcohol use.     Patient Active Problem List   Diagnosis     Elevated blood pressure     Depression with anxiety     Skin lesion of right ear     Diverticulosis of large intestine without hemorrhage     Pancreatic mass       Past Surgical History:    Past Surgical History:   Procedure Laterality Date     BREAST SURGERY      left breast surgery 2008     COLONOSCOPY N/A 10/22/2020    Procedure: COLONOSCOPY;  Surgeon: Iliana Faustin MD;  Location: [facility] OR     ORTHOPEDIC SURGERY      orthoscopic surgery in right knee 1990       Past Medical History:   Past Medical History:   Diagnosis Date     Depressive disorder      Fibroadenoma of left breast     s/p lumpectomy       Social History:   Social History     Tobacco Use     Smoking status: Former     Packs/day: 0.25     Types: Cigarettes     Start date: 7/15/2015     Smokeless tobacco: Never   Substance Use Topics     Alcohol use: Not Currently     Alcohol/week: 0.0 standard drinks     Comment: ocassional       Family History:   Family History   Problem Relation Age of Onset     Depression/Anxiety Mother      Chronic Obstructive Pulmonary Disease Mother      Diabetes Father      Chemical Addiction Father      Depression/Anxiety Father      Diabetes Maternal Grandmother      Hypertension Maternal Grandmother      Diabetes Maternal Grandfather      Hypertension Maternal Grandfather      Breast Cancer Paternal Grandmother         Allergies: No Known Allergies    Active Medications:   Current Outpatient Medications   Medication Sig Dispense Refill     buPROPion (WELLBUTRIN SR) 100 MG 12 hr tablet Take 1 tablet (100 mg) by mouth 2 times daily Take by mouth once daily for 2 weeks, then twice daily 180 tablet 3     sertraline (ZOLOFT) 50 MG tablet Take 1 tablet (50 mg) by mouth daily 90 tablet 3       Systemic Review:   CONSTITUTIONAL: NEGATIVE for fever, chills, change in weight  ENT/MOUTH: NEGATIVE for ear, mouth and throat problems  RESP: NEGATIVE for significant cough or SOB  CV: NEGATIVE for chest pain, palpitations or peripheral edema    Physical Examination:   Vital Signs: There were no vitals taken for this visit.  GENERAL: healthy, alert and no distress  NECK: no adenopathy, no asymmetry, masses, or scars  RESP: lungs clear to auscultation - no rales, rhonchi or wheezes  CV: regular rate and rhythm, normal S1 S2, no S3 or S4, no murmur, click or rub, no peripheral edema and peripheral pulses strong  ABDOMEN: soft, nontender, no hepatosplenomegaly, no masses and bowel sounds normal  MS: no gross musculoskeletal defects noted, no edema    Plan: Appropriate to proceed as scheduled.      Isaac Easton MD  1/9/2023    PCP:  Tracey Guidry     Parenteral Nutrition: 53.1 g  Dextrose Provided by Parenteral Nutrition: 62.5 g  Calories Provided by Parenteral Nutrition: 925 kcals             Goal: Meet >/equal 75% estimated needs   Intervention goal status: Some progress toward goal  Time frame to achieve goal: Ongoing     Dietitian will monitor: Biochemical data, medical tests, procedures, Parenteral nutrition intake     Dietitian Notes & Recommendations  6/15/2023: Recently diagnosed head and neck cancer s/p resection, bone graft and trach/DHT placement. Pt with history of significant wt loss, drinking 2-3 supplements daily PTA.   Consulted to start TF. Spoke with Dr. Lei, requesting trickle feeds for now. Recommend Jevity 1.2 10ml/hr, when able to advance, increase by 10ml q12h due to refeeding risk. Replete K/Mg/Phos as needed. Goal is Jevity 1.2 50ml/hr to provide 1200ml, 1440kcal, 67g pro and 972ml free water. This is sufficient to meet 100% estimated kcal/pro needs. Flush with 30ml q4h and before/after meds for tube patency, additional fluids/flush per MD. Start 200mg thiamine x 5 days.  ADDENDUM: Requested by Dr. Fulton from [facility] to advance TF as recommended by 10ml q12h to goal. Will modify orders. Messaged Dr. Lei re plan to increase TF.    6/16/2023: Pt is refeeding drop in K and Phos this AM. TF continues to advance slowly to goal: Jev 1.2 @ 20mL/hr (goal 50 mL/hr). Spoke with Iqra GONSALEZ, Phos NaK to be given this AM, no issues tolerating. Pt able to nod and refuses acute GI complaints related to TF. Spoke with Dr. Lei re: order 100mg thiamin BID.    6/20/23:  Pt tolerating Jevity 1.2 at goal of 50 ml/hr x 24 hrs meeting 100% est needs via DHT.  Per EMR, possible G-tube placement.  Noted x 2 BM's 6/19.  Received message from Dr. Fulton to change to bolus feeds.   Ok to change TF order per trauma fellow.     REC:  -Change Jevity 1.2 to bolus feeds of 300 ml QID.  [id number].    -FWF per primary.    6/22/23:  Received consult for restart of  TF.  Now s/p open G-tube placement POD #1.  Per surgery team, pt to be on trickle feeds today of 20 ml/hr with possible advancement later today.  Then plan to change back to bolus feeds 6/23.  D/w Dr. Urban.  Noted pt with wt gain of 5 kg since admit, x 7 days.    REC:  -Restart G-tube feeds of Jevity 1.2 at 20 ml/hr, trickle feeds.  -(6/23) change back to bolus feeds of 300 ml QID.    6/23/23: Per RN, when TF was turned up to 40 ml/hr pt c/o abd pain.  TF was then decreased to 30 ml/hr, pt still c/o abd pain.  Surgery team notified.  TF currently running at 20 ml/hr, previously tolerated rate.     6/24/23:  Received call from [facility] TPN pharmD.  Provider PN consult was entered incorrectly.  RD instructed surgery team to re-enter MISC IP parenteral nutrition consult with \"Dietitian and Pharmacist to select and ordrer\".   Pt with peripheral access only.    REC:  -Via peripheral line, give PPN:  Day #1- standard PPN (1250 ml): 62.5 g D, 53 g AA, 250 ml 20% lipids.  Day #2- standard PPN (2L): 85 g AA, 100 g D, 250 ml 20% lipids.    6/25/23:  Pt receiving standard PPN (1250 ml): 62.5 g D, 53 g AA, 250 ml 20% lipids.  Pt on NFNC.  Pt still only with peripheral access. Noted x1 BM 6/23.  G-tube to gravity.  K low, may be refeeding.     REC:  -Continue current standard PPN (1250 ml): 62.5 g D, 53 g AA, 250 ml 20% lipids.  -Continue folic acid and thiamin.    6/26/23: Pt receiving standard PPN: (1250ml) of 53.1 g AA, 62.5 g D, 250 ml 20% lipids.  Pt on HFNC.  Noted phos low, may be refeeding.  Noted x1 BM 6/23.      REC:  -Continue current standard PPN (1250 ml) of 53.1 g AA, 62.5 g D, 250 ml 20% lipids.  -Continue 200 mg thiamin and folic acid daily.     TREATMENT PLAN: Monitoring & Interventions   1.  Via peripheral line, continue PPN:  Day #1- standard PPN (1250 ml): 62.5 g D, 53 g AA, 250 ml 20% lipids.  2.   Add 200 mg thiamin daily x 5 days and  Folic acid to PN  3.  Check K/Mg/Phos daily and replete as needed.  4.   Monitor ability to advance to day #2PPN.  5.  D/w [facility] TPN pharmD     Status: 3    Nutrition Diagnosis / PES  Nutrition Diagnosis: Malnutrition  Malnutrition in the context of acute illness or injury: Severe  Related to: Inability to take/tolerate   As evidenced by: Documented/reported poor oral intake, Weight loss over time   Malnutrition diagnosis acute illness/injury; severe: Moderate depletion of body fat, Moderate depletion of muscle mass, Weight loss of >7.5%/3 months  Primary Nutrition Diagnosis status: Active nutrition diagnosis

## 2023-07-06 ENCOUNTER — ANCILLARY PROCEDURE (OUTPATIENT)
Dept: MAMMOGRAPHY | Facility: CLINIC | Age: 53
End: 2023-07-06
Attending: FAMILY MEDICINE
Payer: COMMERCIAL

## 2023-07-06 DIAGNOSIS — Z12.31 VISIT FOR SCREENING MAMMOGRAM: ICD-10-CM

## 2023-07-06 PROCEDURE — 77067 SCR MAMMO BI INCL CAD: CPT | Mod: GC | Performed by: RADIOLOGY

## 2023-07-06 PROCEDURE — 77063 BREAST TOMOSYNTHESIS BI: CPT | Mod: GC | Performed by: RADIOLOGY

## 2023-07-13 ENCOUNTER — TELEPHONE (OUTPATIENT)
Dept: GASTROENTEROLOGY | Facility: CLINIC | Age: 53
End: 2023-07-13
Payer: COMMERCIAL

## 2023-07-13 NOTE — TELEPHONE ENCOUNTER
Attempted to contact patient in order to complete pre assessment questions.     No answer. Left message to return call to [phone] option 4      Procedure details:    Patient scheduled for Endoscopic ultrasound (EUS) on 7.25.23.     Arrival time: 0800. Procedure time 0930    Pre op exam needed? N/A    Facility location: [facility]; [address], [address]rd [address], [place] [phone]    Sedation type: MAC    Indication for procedure: pancreatic cyst      Chart review:     Electronic implanted devices? No    Diabetic? No      Medication review:    Anticoagulants? No    NSAIDS? No NSAID medications per patient's medication list.  RN will verify with pre-assessment call.    Other medication HOLDING recommendations:  N/A      Bibiana Swift RN  Endoscopy Procedure Pre Assessment RN

## 2023-07-16 DIAGNOSIS — F41.8 DEPRESSION WITH ANXIETY: ICD-10-CM

## 2023-07-17 NOTE — TELEPHONE ENCOUNTER
"Request for medication refill:  sertraline (ZOLOFT) 50 MG tablet  Providers if patient needs an appointment and you are willing to give a one month supply please refill for one month and  send a letter/MyChart using \".SMILLIMITEDREFILL\" .smillimited and route chart to \"[facility] \" (Giving one month refill in non controlled medications is strongly recommended before denial)    If refill has been denied, meaning absolutely no refills without visit, please complete the smart phrase \".smirxrefuse\" and route it to the \"P [facility] MED REFILLS\"  pool to inform the patient and the pharmacy.    CHIVO ANDREWS MA      "

## 2023-07-18 NOTE — TELEPHONE ENCOUNTER
Second call attempt to complete pre assessment.     No answer.  Left message to return call to [phone] #4 within 24 hours or risk procedure being cancelled.     Additional information needed?  N/A    [employer] Diabetes[employer] message sent      Cintia George RN  Endoscopy Procedure Pre Assessment RN

## 2023-07-19 ENCOUNTER — VIRTUAL VISIT (OUTPATIENT)
Dept: FAMILY MEDICINE | Facility: CLINIC | Age: 53
End: 2023-07-19
Payer: COMMERCIAL

## 2023-07-19 ENCOUNTER — TELEPHONE (OUTPATIENT)
Dept: GASTROENTEROLOGY | Facility: CLINIC | Age: 53
End: 2023-07-19

## 2023-07-19 DIAGNOSIS — F33.40 RECURRENT MAJOR DEPRESSIVE DISORDER, IN REMISSION (H): ICD-10-CM

## 2023-07-19 DIAGNOSIS — K86.89 PANCREATIC MASS: ICD-10-CM

## 2023-07-19 DIAGNOSIS — Z76.89 ENCOUNTER TO ESTABLISH CARE WITH NEW DOCTOR: Primary | ICD-10-CM

## 2023-07-19 DIAGNOSIS — K57.30 DIVERTICULOSIS OF LARGE INTESTINE WITHOUT HEMORRHAGE: ICD-10-CM

## 2023-07-19 PROCEDURE — 99214 OFFICE O/P EST MOD 30 MIN: CPT | Mod: VID | Performed by: FAMILY MEDICINE

## 2023-07-19 NOTE — TELEPHONE ENCOUNTER
Caller: No call made  Reason for Reschedule/Cancellation (please be detailed, any staff messages or encounters to note?): Pre-assessment call not completed.       Prior to reschedule please review:    Ordering Provider: Thomas Fleming MD    Sedation per order: MAC    Does patient have any ASC Exclusions, please identify?: No      Notes on Cancelled Procedure:    Procedure: ENDOSCOPIC ULTRASOUND [EUS]     Date: 7/25/2023    Location: [facility]; [address] [place] [address], [address]rd Floor, [place] [address]    Surgeon: Lonnie      Rescheduled: No , LVM and sent MyChart.

## 2023-07-19 NOTE — TELEPHONE ENCOUNTER
No return call received.   Pre assessment was not completed for upcoming scheduled procedure.     Staff message sent to endoscopy scheduling to cancel procedure per policy.       Cintia George, RN   Endoscopy Procedure Pre Assessment RN

## 2023-07-19 NOTE — PROGRESS NOTES
Marisa is a 53 year old who is being evaluated via a billable video visit.      How would you like to obtain your AVS? [employer]  If the video visit is dropped, the invitation should be resent by:   Will anyone else be joining your video visit? No        Assessment & Plan     Pavithra was seen today for establish care.    Diagnoses and all orders for this visit:    Encounter to establish care with new doctor    New to me today; reviewed and updated past medical history and problem list and allergies via chart review ([facility], Care Everywhere) and with patient.    Also discussed that if has concerns to be answered in 3 to 5 days [employer] messaging is a great option.  If has concerns that need to be addressed the same day please call the clinic number and press 2 to talk to our local team.    Pancreatic mass  Comments:  Repeat scan and EUS in July.  If has grown will recommend surgery.  Followed by Dr. Fleming GI    Depression with Anxiety (H)  Comments:  2023: reviewed and has been on sertraline and bupropion through PCP with good results    Diverticulosis of large intestine without hemorrhage  Comments:  with episode of possible diverticulitis in January 2023  Resolved now  Discussed how we would manage if recurs    Other orders  -     PRIMARY CARE FOLLOW-UP SCHEDULING; Future      Breanna Forrest MD  [facility]    Subjective   Marisa is a 53 year old, presenting for the following health issues:  Establish Care (Est Care)        7/19/2023    11:20 AM   Additional Questions   Roomed by Nori Hurtado     History of Present Illness       Reason for visit:  Due to aging and recent issues I need to establish care with a PCP who can track me and help with new meds, etc. if needed.    Marisa Aguirre eats 2-3 servings of fruits and vegetables daily.Marisa Aguirre consumes 0 sweetened beverage(s) daily.Marisa Aguirre exercises with enough effort to increase Marisa Aguirre's heart rate 10 to 19 minutes per day.   Marisa Aguirre exercises with enough effort to increase Marisa Aguirre's heart rate 3 or less days per week.   Marisa Aguirre is taking medications regularly.     Marisa new to me.  Has had great primary care physician - Dr. Guidry.  Been jemal, been pretty healthy.  But recently had mass found in pancreas followed by diverticulitis.    Mass in pancreas is being checked every 3 months.  If grows - will recommend surgery.  Possible impacts of that including digestion enzymes & diabetes.    Feeling that though each individual provider was great the clinic as a whole was not able to support her during this period of a lot of health issues.  Is hoping to change to a clinic that has a more robust support staff.      Review of Systems         Objective           Vitals:  No vitals were obtained today due to virtual visit.    Physical Exam   GENERAL: Healthy, alert and no distress  EYES: Eyes grossly normal to inspection.  No discharge or erythema, or obvious scleral/conjunctival abnormalities.  RESP: No audible wheeze, cough, or visible cyanosis.  No visible retractions or increased work of breathing.    SKIN: Visible skin clear. No significant rash, abnormal pigmentation or lesions.  NEURO: Cranial nerves grossly intact.  Mentation and speech appropriate for age.  PSYCH: Mentation appears normal, affect normal/bright, judgement and insight intact, normal speech and appearance well-groomed.            Video-Visit Details    Type of service:  Video Visit     Originating Location (pt. Location): Home  Distant Location (provider location):  On-site  Platform used for Video Visit: [employer]

## 2023-07-21 ENCOUNTER — TELEPHONE (OUTPATIENT)
Dept: GASTROENTEROLOGY | Facility: CLINIC | Age: 53
End: 2023-07-21
Payer: COMMERCIAL

## 2023-07-21 NOTE — TELEPHONE ENCOUNTER
Procedure rescheduled to 8/1/23    Attempted to contact patient in order to complete pre assessment questions.     No answer. Left message to return call to [phone] option 4      Procedure details:    Patient scheduled for Endoscopic ultrasound (EUS) on 8/1/23.     Arrival time: 0800. Procedure time 0930    Pre op exam needed? N/A     Facility location: [facility]; [address] [place] [address], [address], [place] [phone]    Sedation type: MAC     Other medication HOLDING recommendations:  N/A      Pavithra Brice RN  Endoscopy Procedure Pre Assessment RN  [phone] option 4

## 2023-07-21 NOTE — TELEPHONE ENCOUNTER
Patient calling to complete pre assessment. Upon review procedure was cancelled due to policy as no pre assessment was completed. Patient verbalized understanding. Transferred call to endoscopy scheduling to reschedule.    Pavithra Brice RN  Endoscopy Procedure Pre Assessment RN  [phone] option 4

## 2023-07-21 NOTE — TELEPHONE ENCOUNTER
Caller: Pavithra Aguirre    Reason for Reschedule/Cancellation (please be detailed, any staff messages or encounters to note?): pt called to reschedule. Cancelled due to missing pre assesment call.      Prior to reschedule please review:    Ordering Provider: : Thomas Fleming MD    Sedation per order: MAC    Does patient have any ASC Exclusions, please identify?: no      Notes on Cancelled Procedure:    Procedure: ENDOSCOPIC ULTRASOUND [EUS]     Date: 7/25/23    Location: [facility]; [address], [address] [place] [address]    Surgeon: Dontrell      Rescheduled: Yes    Procedure: ENDOSCOPIC ULTRASOUND [EUS]     Date: 8/1/23    Location: [facility]; [address] [place] [address]    Surgeon: dontrell    Sedation Level Scheduled  MAC,  Reason for Sedation Level PER ORDER    Prep/Instructions updated and sent: YES/MYCHART     Send In - basket message to Panc - Fox Pool if EUS  procedure is canceled or rescheduled: [ N/A, YES or NO] N/A

## 2023-07-24 NOTE — TELEPHONE ENCOUNTER
"Requested Prescriptions   Pending Prescriptions Disp Refills     metoprolol tartrate (LOPRESSOR) 100 MG tablet [Pharmacy Med Name: METOPROLOL TART 100MG TAB] 180 tablet 1      Last Written Prescription Date:  07/17/18  Last Fill Quantity: 180,  # refills: 1   Last Office Visit with G, P or [facility] prescribing provider:  10/10/18-[facility]   Future Office Visit:    Sig: TAKE 1 TABLET BY MOUTH TWICE DAILY    Beta-Blockers Protocol Failed - 1/9/2019  2:23 PM       Failed - Blood pressure under 140/90 in past 12 months    BP Readings from Last 3 Encounters:   10/10/18 150/84   10/11/17 146/88   02/22/17 124/72                Passed - Patient is age 6 or older       Passed - Recent (12 mo) or future (30 days) visit within the authorizing provider's specialty    Patient had office visit in the last 12 months or has a visit in the next 30 days with authorizing provider or within the authorizing provider's specialty.  See \"Patient Info\" tab in inbasket, or \"Choose Columns\" in Meds & Orders section of the refill encounter.             Passed - Medication is active on med list          " Pre assessment completed for upcoming procedure.   (Please see previous telephone encounter notes for complete details)    Patient  returned call.       Procedure details:    Arrival time and facility location reviewed    Pre op exam needed? N/A    Designated  policy reviewed. Instructed to have someone stay 24 hours post procedure.     COVID policy reviewed.      Medication review:    Medications reviewed. Please see supporting documentation below. Holding recommendations discussed (if applicable).       Prep for procedure:     Reviewed procedure prep instructions.       Additional information needed?  N/A      Patient  verbalized understanding and had no questions or concerns at this time.      Rebeca Tang RN  Endoscopy Procedure Pre Assessment RN  [phone] option 4

## 2023-07-31 ENCOUNTER — ANESTHESIA EVENT (OUTPATIENT)
Dept: GASTROENTEROLOGY | Facility: CLINIC | Age: 53
End: 2023-07-31
Payer: COMMERCIAL

## 2023-07-31 RX ORDER — OXYCODONE HYDROCHLORIDE 5 MG/1
5 TABLET ORAL
Status: CANCELLED | OUTPATIENT
Start: 2023-07-31

## 2023-07-31 RX ORDER — ONDANSETRON 2 MG/ML
4 INJECTION INTRAMUSCULAR; INTRAVENOUS EVERY 30 MIN PRN
Status: CANCELLED | OUTPATIENT
Start: 2023-07-31

## 2023-07-31 RX ORDER — OXYCODONE HYDROCHLORIDE 10 MG/1
10 TABLET ORAL
Status: CANCELLED | OUTPATIENT
Start: 2023-07-31

## 2023-07-31 RX ORDER — ONDANSETRON 4 MG/1
4 TABLET, ORALLY DISINTEGRATING ORAL EVERY 30 MIN PRN
Status: CANCELLED | OUTPATIENT
Start: 2023-07-31

## 2023-08-01 ENCOUNTER — ANESTHESIA (OUTPATIENT)
Dept: GASTROENTEROLOGY | Facility: CLINIC | Age: 53
End: 2023-08-01
Payer: COMMERCIAL

## 2023-08-01 ENCOUNTER — HOSPITAL ENCOUNTER (OUTPATIENT)
Facility: CLINIC | Age: 53
Discharge: HOME | End: 2023-08-01
Attending: INTERNAL MEDICINE | Admitting: INTERNAL MEDICINE
Payer: COMMERCIAL

## 2023-08-01 ENCOUNTER — TRANSFERRED RECORDS (OUTPATIENT)
Dept: HEALTH INFORMATION MANAGEMENT | Facility: CLINIC | Age: 53
End: 2023-08-01

## 2023-08-01 VITALS
HEIGHT: 71 IN | DIASTOLIC BLOOD PRESSURE: 94 MMHG | OXYGEN SATURATION: 100 % | BODY MASS INDEX: 35 KG/M2 | HEART RATE: 60 BPM | RESPIRATION RATE: 16 BRPM | SYSTOLIC BLOOD PRESSURE: 150 MMHG | WEIGHT: 250 LBS

## 2023-08-01 DIAGNOSIS — K86.89 PANCREATIC MASS: Primary | ICD-10-CM

## 2023-08-01 PROCEDURE — 250N000009 HC RX 250: Performed by: ANESTHESIOLOGY

## 2023-08-01 PROCEDURE — 370N000017 HC ANESTHESIA TECHNICAL FEE, PER MIN: Performed by: INTERNAL MEDICINE

## 2023-08-01 PROCEDURE — 43242 EGD US FINE NEEDLE BX/ASPIR: CPT | Performed by: INTERNAL MEDICINE

## 2023-08-01 PROCEDURE — 258N000003 HC RX IP 258 OP 636: Performed by: ANESTHESIOLOGY

## 2023-08-01 PROCEDURE — 250N000011 HC RX IP 250 OP 636: Performed by: ANESTHESIOLOGY

## 2023-08-01 RX ORDER — LIDOCAINE 40 MG/G
CREAM TOPICAL
Status: DISCONTINUED | OUTPATIENT
Start: 2023-08-01 | End: 2023-08-01 | Stop reason: HOSPADM

## 2023-08-01 RX ORDER — PROPOFOL 10 MG/ML
INJECTION, EMULSION INTRAVENOUS PRN
Status: DISCONTINUED | OUTPATIENT
Start: 2023-08-01 | End: 2023-08-01

## 2023-08-01 RX ORDER — NALOXONE HYDROCHLORIDE 0.4 MG/ML
0.2 INJECTION, SOLUTION INTRAMUSCULAR; INTRAVENOUS; SUBCUTANEOUS
Status: CANCELLED | OUTPATIENT
Start: 2023-08-01

## 2023-08-01 RX ORDER — OXYCODONE HYDROCHLORIDE 5 MG/1
5 TABLET ORAL EVERY 6 HOURS PRN
Qty: 6 TABLET | Refills: 0 | Status: SHIPPED | OUTPATIENT
Start: 2023-08-01 | End: 2023-08-04

## 2023-08-01 RX ORDER — ONDANSETRON 2 MG/ML
4 INJECTION INTRAMUSCULAR; INTRAVENOUS EVERY 6 HOURS PRN
Status: CANCELLED | OUTPATIENT
Start: 2023-08-01

## 2023-08-01 RX ORDER — NALOXONE HYDROCHLORIDE 0.4 MG/ML
0.4 INJECTION, SOLUTION INTRAMUSCULAR; INTRAVENOUS; SUBCUTANEOUS
Status: CANCELLED | OUTPATIENT
Start: 2023-08-01

## 2023-08-01 RX ORDER — SODIUM CHLORIDE, SODIUM LACTATE, POTASSIUM CHLORIDE, CALCIUM CHLORIDE 600; 310; 30; 20 MG/100ML; MG/100ML; MG/100ML; MG/100ML
INJECTION, SOLUTION INTRAVENOUS CONTINUOUS
Status: DISCONTINUED | OUTPATIENT
Start: 2023-08-01 | End: 2023-08-01 | Stop reason: HOSPADM

## 2023-08-01 RX ORDER — ONDANSETRON 2 MG/ML
INJECTION INTRAMUSCULAR; INTRAVENOUS PRN
Status: DISCONTINUED | OUTPATIENT
Start: 2023-08-01 | End: 2023-08-01

## 2023-08-01 RX ORDER — LEVOFLOXACIN 5 MG/ML
INJECTION, SOLUTION INTRAVENOUS PRN
Status: DISCONTINUED | OUTPATIENT
Start: 2023-08-01 | End: 2023-08-01

## 2023-08-01 RX ORDER — LEVOFLOXACIN 500 MG/1
500 TABLET, FILM COATED ORAL DAILY
Qty: 5 TABLET | Refills: 0 | Status: SHIPPED | OUTPATIENT
Start: 2023-08-02 | End: 2023-08-07

## 2023-08-01 RX ORDER — LIDOCAINE HYDROCHLORIDE 20 MG/ML
INJECTION, SOLUTION INFILTRATION; PERINEURAL PRN
Status: DISCONTINUED | OUTPATIENT
Start: 2023-08-01 | End: 2023-08-01

## 2023-08-01 RX ORDER — FLUMAZENIL 0.1 MG/ML
0.2 INJECTION, SOLUTION INTRAVENOUS
Status: CANCELLED | OUTPATIENT
Start: 2023-08-01 | End: 2023-08-01

## 2023-08-01 RX ORDER — SODIUM CHLORIDE, SODIUM LACTATE, POTASSIUM CHLORIDE, CALCIUM CHLORIDE 600; 310; 30; 20 MG/100ML; MG/100ML; MG/100ML; MG/100ML
INJECTION, SOLUTION INTRAVENOUS CONTINUOUS PRN
Status: DISCONTINUED | OUTPATIENT
Start: 2023-08-01 | End: 2023-08-01

## 2023-08-01 RX ORDER — PROPOFOL 10 MG/ML
INJECTION, EMULSION INTRAVENOUS CONTINUOUS PRN
Status: DISCONTINUED | OUTPATIENT
Start: 2023-08-01 | End: 2023-08-01

## 2023-08-01 RX ORDER — ONDANSETRON 4 MG/1
4 TABLET, ORALLY DISINTEGRATING ORAL EVERY 6 HOURS PRN
Status: CANCELLED | OUTPATIENT
Start: 2023-08-01

## 2023-08-01 RX ORDER — ONDANSETRON 4 MG/1
4 TABLET, ORALLY DISINTEGRATING ORAL EVERY 8 HOURS PRN
Qty: 20 TABLET | Refills: 0 | Status: SHIPPED | OUTPATIENT
Start: 2023-08-01 | End: 2023-08-08

## 2023-08-01 RX ADMIN — PROPOFOL 20 MG: 10 INJECTION, EMULSION INTRAVENOUS at 10:31

## 2023-08-01 RX ADMIN — PROPOFOL 30 MG: 10 INJECTION, EMULSION INTRAVENOUS at 10:44

## 2023-08-01 RX ADMIN — PROPOFOL 20 MG: 10 INJECTION, EMULSION INTRAVENOUS at 10:35

## 2023-08-01 RX ADMIN — PROPOFOL 150 MCG/KG/MIN: 10 INJECTION, EMULSION INTRAVENOUS at 10:31

## 2023-08-01 RX ADMIN — LEVOFLOXACIN 500 MG: 5 INJECTION, SOLUTION INTRAVENOUS at 10:45

## 2023-08-01 RX ADMIN — SODIUM CHLORIDE, POTASSIUM CHLORIDE, SODIUM LACTATE AND CALCIUM CHLORIDE: 600; 310; 30; 20 INJECTION, SOLUTION INTRAVENOUS at 10:30

## 2023-08-01 RX ADMIN — ONDANSETRON 4 MG: 2 INJECTION INTRAMUSCULAR; INTRAVENOUS at 10:30

## 2023-08-01 RX ADMIN — LIDOCAINE HYDROCHLORIDE 50 MG: 20 INJECTION, SOLUTION INFILTRATION; PERINEURAL at 10:30

## 2023-08-01 RX ADMIN — PROPOFOL 30 MG: 10 INJECTION, EMULSION INTRAVENOUS at 10:47

## 2023-08-01 ASSESSMENT — LIFESTYLE VARIABLES: TOBACCO_USE: 1

## 2023-08-01 ASSESSMENT — ACTIVITIES OF DAILY LIVING (ADL)
ADLS_ACUITY_SCORE: 35
ADLS_ACUITY_SCORE: 35

## 2023-08-01 NOTE — DISCHARGE INSTRUCTIONS
Discharge Instructions after Endoscopic Ultrasound    Activity  You were given medicine for pain. You may be dizzy or sleepy.    For 24 hours:  Do not drive or use heavy equipment.  Do not make important decisions.  Do not drink any alcohol.    Diet  Wait one hour before eating or drinking. Start with sips of water. When your gag reflex has returned you  may go back to your usual diet, medicines and light exercise.    Discomfort  Some bloating is normal. You may have large burps or pass air.  You may have a sore throat for 2 to 3 days. It may help to:  Avoid hot liquids for 24 hours.  Use sore throat lozenges.  Gargle as needed with salt water up to 4 times a day. Mix 1 cup of warm water with 1 teaspoon of salt. Do not swallow.  You may take Tylenol (acetaminophen) for pain unless your doctor has told you not to.    Do not take aspirin or ibuprofen (Advil, Motrin, or other anti-inflammatory  drugs) for 3 days.    Follow-up  We took small tissue or fluid samples to study. We will call you with the results in about 10 working days.    When to call    Call right away if you have:  Severe throat pain or trouble swallowing  Black stools (tar-like looking bowel movement)  Fever above 100.6 F (37.5 C)  Unusual pain in belly or chest not relieved by belching or passing air.    If you vomit blood or have severe pain, go to an emergency room.    If you have questions, call    Monday to Friday, 8 a.m. to 4:30 p.m.:   Central Scheduling Department: [phone]  After hours: Hospital: [phone] (Ask for the GI fellow on call)

## 2023-08-01 NOTE — BRIEF OP NOTE
[facility]    Brief Operative Note    Pre-operative diagnosis: Pancreatic cyst [K86.2]  Post-operative diagnosis Same as pre-operative diagnosis    Procedure: Procedure(s):  ESOPHAGOGASTRODUODENOSCOPY, WITH FINE NEEDLE ASPIRATION BIOPSY, WITH ENDOSCOPIC ULTRASOUND GUIDANCE  Surgeon: Surgeon(s) and Role:     * Thomas Fleming MD - Primary  Anesthesia: MAC   Estimated Blood Loss: Minimal    Drains: None  Specimens:   ID Type Source Tests Collected by Time Destination   A : pancreatic cyst fluid specimen via FNA Cyst Pancreas LABORATORY MISCELLANEOUS ORDER Thomas Fleming MD 8/1/2023 11:09 AM      Findings:   None.  Complications: None.  Implants: * No implants in log *    Findings: successful 26mm x 19mm tail of pancreas cyst aspiration with a 25g [employer] Echotip needle of 7cc clear, thin (non-viscous fluid).  The dominant cyst contained several non-depended cystic lesions measuring 5mm x 4mm.  These were not aspirated.      10mm x 10mm L adrenal adenoma identified, consistent with MRI findings of adrenal adenoma.    Recommendations:  - follow up cyst fluid lab evaluation

## 2023-08-01 NOTE — ANESTHESIA POSTPROCEDURE EVALUATION
Patient: Pavithra Aguirre    Procedure: Procedure(s):  ESOPHAGOGASTRODUODENOSCOPY, WITH FINE NEEDLE ASPIRATION BIOPSY, WITH ENDOSCOPIC ULTRASOUND GUIDANCE       Anesthesia Type:  MAC    Note:  Disposition: Outpatient   Postop Pain Control: Uneventful            Sign Out: Well controlled pain   PONV: No   Neuro/Psych:    Airway/Respiratory: Uneventful            Sign Out: Acceptable/Baseline resp. status   CV/Hemodynamics: Uneventful            Sign Out: Acceptable CV status; No obvious hypovolemia; No obvious fluid overload   Other NRE: NONE   DID A NON-ROUTINE EVENT OCCUR? No           Last vitals:  Vitals Value Taken Time   /94 08/01/23 1140   Temp     Pulse 60 08/01/23 1140   Resp 16 08/01/23 1140   SpO2 99 % 08/01/23 1141   Vitals shown include unvalidated device data.    Electronically Signed By: Erick Du MD  August 1, 2023  1:01 PM

## 2023-08-01 NOTE — H&P
Gastroenterology Pre-op History and Physical    Pavithra Aguirre MRN# [id number]   Age: 53 year old YOB: 1970      Date of Surgery: 08/01/23  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  Prior EUS with low CEA and elevated amylase. Benign cytology.  Given size, being evaluated for possible surgical resection.   Repeat EUS for repeat fluid analysis and possible PancraGen testing planned.           Past Medical and Surgical History:     Past Medical History:   Diagnosis Date    Depressive disorder     Fibroadenoma of left breast     s/p lumpectomy     Past Surgical History:   Procedure Laterality Date    BREAST SURGERY      left breast surgery 2008    COLONOSCOPY N/A 10/22/2020    Procedure: COLONOSCOPY;  Surgeon: Iliana Faustin MD;  [place] [facility] [place]OSCOPIC ULTRASOUND UPPER GASTROINTESTINAL TRACT (GI) N/A 1/10/2023    Procedure: ENDOSCOPIC ULTRASOUND, ESOPHAGOSCOPY / UPPER GASTROINTESTINAL TRACT (GI);  Surgeon: Thomas Fleming MD;  Location:  GI    ORTHOPEDIC SURGERY      orthoscopic surgery in right knee 1990            Medications (include herbals and vitamins):        Medications Prior to Admission   Medication Sig Dispense Refill Last Dose    buPROPion (WELLBUTRIN SR) 100 MG 12 hr tablet Take 1 tablet (100 mg) by mouth 2 times daily Take by mouth once daily for 2 weeks, then twice daily 180 tablet 3 7/31/2023    sertraline (ZOLOFT) 50 MG tablet TAKE 1 TABLET(50 MG) BY MOUTH DAILY 90 tablet 3 7/31/2023             Allergies:      Allergies   Allergen Reactions    Erythromycin     Hydrocodone-Acetaminophen Other (See Comments)     Skin on tongue came off, and nausea/vomiting               Physical Exam:   All vitals have been reviewed  Patient Vitals for the past 8 hrs:   BP Pulse Resp SpO2  "Height Weight   08/01/23 0815 (!) 155/120 76 16 99 % 1.791 m (5' 10.5\") 113.4 kg (250 lb)     No intake/output data recorded.  Airway assessment:   Patient is able to open mouth wide  Patient is able to stick out tongue  Mallampatti classification: Class I (visualization of the soft palate, fauces, uvula, anterior and posterior pillars)}      Lungs:   No increased work of breathing, good air exchange, clear to auscultation bilaterally, no crackles or wheezing     Cardiovascular:   regular rate and rhythm and normal S1 and S2                 Anesthetic risk and/or ASA classification:   ASA 2    Thomas Fleming MD        "

## 2023-08-01 NOTE — ANESTHESIA CARE TRANSFER NOTE
Patient: Pavithra Aguirre    Procedure: Procedure(s):  ESOPHAGOGASTRODUODENOSCOPY, WITH FINE NEEDLE ASPIRATION BIOPSY, WITH ENDOSCOPIC ULTRASOUND GUIDANCE       Diagnosis: Pancreatic cyst [K86.2]  Diagnosis Additional Information: No value filed.    Anesthesia Type:   MAC     Note:    Oropharynx: oropharynx clear of all foreign objects and spontaneously breathing  Level of Consciousness: awake  Oxygen Supplementation: room air    Independent Airway: airway patency satisfactory and stable  Dentition: dentition unchanged  Vital Signs Stable: post-procedure vital signs reviewed and stable  Report to RN Given: handoff report given  Patient transferred to: Phase II    Handoff Report: Identifed the Patient, Identified the Reponsible Provider, Reviewed the pertinent medical history, Discussed the surgical course, Reviewed Intra-OP anesthesia mangement and issues during anesthesia, Set expectations for post-procedure period and Allowed opportunity for questions and acknowledgement of understanding      Vitals:  Vitals Value Taken Time   /82    Temp     Pulse 76    Resp     SpO2 98        Electronically Signed By: HERNÁN Marvin CRNA  August 1, 2023  11:17 AM

## 2023-08-01 NOTE — OR NURSING
EGD/EUS with FNA of pancreatic cyst performed under MAC, patient tolerated well. Transferred to  and report given to WU Le.

## 2023-08-01 NOTE — ANESTHESIA PREPROCEDURE EVALUATION
Anesthesia Pre-Procedure Evaluation    Patient: Pavithra Aguirre   MRN: [id number] : 1970        Procedure : Procedure(s):  Endoscopic ultrasound upper gastrointestinal tract (GI)          Past Medical History:   Diagnosis Date    Depressive disorder     Fibroadenoma of left breast     s/p lumpectomy      Past Surgical History:   Procedure Laterality Date    BREAST SURGERY      left breast surgery     COLONOSCOPY N/A 10/22/2020    Procedure: COLONOSCOPY;  Surgeon: Iliana Faustin MD;  Location[place] [facility] [place] [place]    ENDOSCOPIC ULTRASOUND UPPER GASTROINTESTINAL TRACT (GI) N/A 1/10/2023    Procedure: ENDOSCOPIC ULTRASOUND, ESOPHAGOSCOPY / UPPER GASTROINTESTINAL TRACT (GI);  Surgeon: Thomas Fleming MD;  Location:  GI    ORTHOPEDIC SURGERY      orthoscopic surgery in right knee       Allergies   Allergen Reactions    Erythromycin     Hydrocodone-Acetaminophen Other (See Comments)     Skin on tongue came off, and nausea/vomiting      Social History     Tobacco Use    Smoking status: Former     Packs/day: 0.25     Types: Cigarettes     Start date: 7/15/2015    Smokeless tobacco: Never   Substance Use Topics    Alcohol use: Not Currently     Alcohol/week: 0.0 standard drinks of alcohol     Comment: ocassional      Wt Readings from Last 1 Encounters:   23 113.4 kg (250 lb)        Anesthesia Evaluation   Pt has had prior anesthetic. Type: MAC.    No history of anesthetic complications       ROS/MED HX  ENT/Pulmonary:  - neg pulmonary ROS   (+)                tobacco use, Past use,                      Neurologic:  - neg neurologic ROS     Cardiovascular:     (+)  hypertension- -   -  - -                                      METS/Exercise Tolerance: >4 METS    Hematologic:       Musculoskeletal:       GI/Hepatic: Comment: Pancreas mass   (-) GERD   Renal/Genitourinary:       Endo:       Psychiatric/Substance Use:     (+) psychiatric history anxiety and depression       Infectious Disease:       Malignancy:  "      Other:          BP (!) 155/120   Pulse 76   Resp 16   Ht 1.791 m (5' 10.5\")   Wt 113.4 kg (250 lb)   SpO2 99%   BMI 35.36 kg/m      Physical Exam    Airway        Mallampati: II   TM distance: > 3 FB   Neck ROM: full   Mouth opening: > 3 cm    Respiratory Devices and Support         Dental       (+) Minor Abnormalities - some fillings, tiny chips      Cardiovascular   cardiovascular exam normal          Pulmonary   pulmonary exam normal                OUTSIDE LABS:  CBC:   Lab Results   Component Value Date    WBC 9.3 05/02/2023    WBC 10.9 01/10/2023    HGB 13.1 05/02/2023    HGB 16.0 01/10/2023    HCT 38.8 05/02/2023    HCT 47.1 01/10/2023     05/02/2023     01/10/2023     BMP:   Lab Results   Component Value Date     05/02/2023     01/10/2023    POTASSIUM 4.6 05/02/2023    POTASSIUM 3.7 01/10/2023    CHLORIDE 102 05/02/2023    CHLORIDE 102 01/10/2023    CO2 27 05/02/2023    CO2 18 (L) 01/10/2023    BUN 8.2 05/02/2023    BUN 13.1 01/10/2023    CR 0.80 05/02/2023    CR 0.81 01/10/2023    GLC 96 05/02/2023     (H) 01/10/2023     COAGS: No results found for: PTT, INR, FIBR  POC:   Lab Results   Component Value Date    HCG Negative 10/22/2020     HEPATIC:   Lab Results   Component Value Date    ALBUMIN 4.3 05/02/2023    PROTTOTAL 7.9 05/02/2023    ALT 17 05/02/2023    AST 14 05/02/2023    ALKPHOS 98 05/02/2023    BILITOTAL 0.2 05/02/2023     OTHER:   Lab Results   Component Value Date    A1C 5.2 02/05/2015    LI 10.3 (H) 05/02/2023    LIPASE 26 05/02/2023    AMYLASE 68 05/02/2023    TSH 0.77 02/05/2015       Anesthesia Plan    ASA Status:  2    NPO Status:  NPO Appropriate    Anesthesia Type: MAC.     - Reason for MAC: straight local not clinically adequate   Induction: Propofol.   Maintenance: TIVA.        Consents    Anesthesia Plan(s) and associated risks, benefits, and realistic alternatives discussed. Questions answered and patient/representative(s) expressed " understanding.     - Discussed:     - Discussed with:  Patient      - Extended Intubation/Ventilatory Support Discussed: No.      - Patient is DNR/DNI Status: No     Use of blood products discussed: No .     Postoperative Care    Pain management: IV analgesics, Oral pain medications.   PONV prophylaxis: Ondansetron (or other 5HT-3)     Comments:                Erick Du MD

## 2023-08-10 LAB — SCANNED LAB RESULT: NORMAL

## 2023-08-13 LAB — UPPER EUS: NORMAL

## 2023-09-01 ENCOUNTER — TELEPHONE (OUTPATIENT)
Dept: GASTROENTEROLOGY | Facility: CLINIC | Age: 53
End: 2023-09-01
Payer: COMMERCIAL

## 2023-09-01 NOTE — TELEPHONE ENCOUNTER
Briseyda Sparks I was unable to reach her by phone, but sent a detailed [employer] message.    Please arrange for MRI abdomen with contrast in 1 year and clinic visit (can be virtual) 1 week after.    Could you please contact her next week to ensure that she saw the message and see if she has other questions?    Corky

## 2023-09-05 ENCOUNTER — PATIENT OUTREACH (OUTPATIENT)
Dept: GASTROENTEROLOGY | Facility: CLINIC | Age: 53
End: 2023-09-05
Payer: COMMERCIAL

## 2023-09-05 NOTE — TELEPHONE ENCOUNTER
Called pt as follow up to request for biopsy results, Dr Fleming did respond via [employer] but appears pt has not yet read the message. Left VM with request for her to read the [employer] message and call with any questions. Follow up would be MRI/clinic in one year. Reminder sent.    Irena Hopper RN, BSN,   [facility]  Care coordinator

## 2023-10-23 DIAGNOSIS — F41.8 DEPRESSION WITH ANXIETY: ICD-10-CM

## 2023-10-24 NOTE — TELEPHONE ENCOUNTER
"Request for medication refill:    buPROPion (WELLBUTRIN SR) 100 MG 12 hr tablet     Providers if patient needs an appointment and you are willing to give a one month supply please refill for one month and  send a letter/MyChart using \".SMILLIMITEDREFILL\" .smillimited and route chart to \"[facility] \" (Giving one month refill in non controlled medications is strongly recommended before denial)    If refill has been denied, meaning absolutely no refills without visit, please complete the smart phrase \".smirxrefuse\" and route it to the \"[facility] MED REFILLS\"  pool to inform the patient and the pharmacy.    CHIVO ANDREWS MA     "

## 2023-10-25 RX ORDER — BUPROPION HYDROCHLORIDE 100 MG/1
TABLET, EXTENDED RELEASE ORAL
Qty: 180 TABLET | Refills: 3 | Status: SHIPPED | OUTPATIENT
Start: 2023-10-25 | End: 2024-02-09

## 2023-11-07 ENCOUNTER — MYC MEDICAL ADVICE (OUTPATIENT)
Dept: FAMILY MEDICINE | Facility: CLINIC | Age: 53
End: 2023-11-07
Payer: COMMERCIAL

## 2023-11-07 DIAGNOSIS — Z87.891 PERSONAL HISTORY OF TOBACCO USE: Primary | ICD-10-CM

## 2023-11-07 PROCEDURE — G0296 VISIT TO DETERM LDCT ELIG: HCPCS | Performed by: FAMILY MEDICINE

## 2023-11-08 NOTE — TELEPHONE ENCOUNTER
"Dr. Forrest-Please review and advise if CT lung cancer screening can be ordered prior to patient's February 2024 visit with you?    \"Hello -  I received the message below via [employer], instructing me to call my clinic to schedule a routine lunch scan.  I called to reschedule my annual exam with Dr. Forrest (so sorry, my mother is having surgery 12/22) and asked about the lung scan but was told to call imaging scheduling.  Imagining scheduling would not schedule because they said they have no order from my clinic.  Can you verify if I should have the Lung Cancer scan described below?  Best wishes - Marisa  ---  [facility] [address] [address]  October 12, 2023  Dear Pavithra Aguirre  RE: Reminder about lung cancer screening    It s time for your yearly exam to check for lung cancer.   You should have a yearly exam if:  You re age 55 to 80 (55 to 77, if you re on Medicare)  You ve smoked a pack a day for at least 30 years (or 2 packs a day for at least 15 years)  If you no longer smoke, it s been less than 15 years since you quit  Please call your clinic to schedule your exam. Some insurance plans require an office visit before you can have this exam. Check your plan for details.    These exams work best when done every year. They are good at finding lung cancer early, but they can t find all lung cancers. If you develop any symptoms (shortness of breath, chest pain, coughing up blood), call your doctor right away.  We look forward to seeing you soon.  Sincerely,  [facility]\"    Thank you!  DAVON RojasN, RN-[facility]    "

## 2023-11-09 NOTE — TELEPHONE ENCOUNTER
Lung Cancer Screening Shared Decision Making Visit     Pavithra Aguirre, a 53 year old other, is eligible for lung cancer screening    History   Smoking Status     Former     Packs/day: 0.25     Types: Cigarettes     Start date: 7/15/2015   Smokeless Tobacco     Never       I have discussed with patient the risks and benefits of screening for lung cancer with low-dose CT.     The risks include:    radiation exposure: one low dose chest CT has as much ionizing radiation as about 15 chest x-rays, or 6 months of background radiation living in [place]      false positives: most findings/nodules are NOT cancer, but some might still require additional diagnostic evaluation, including biopsy    over-diagnosis: some slow growing cancers that might never have been clinically significant will be detected and treated unnecessarily     The benefit of early detection of lung cancer is contingent upon adherence to annual screening or more frequent follow up if indicated.     Furthermore, to benefit from screening, Pavithra must be willing and able to undergo diagnostic procedures, if indicated. Although no specific guide is available for determining severity of comorbidities, it is reasonable to withhold screening in patients who have greater mortality risk from other diseases.     We did discuss that the best way to prevent lung cancer is to not smoke.    Some patients may value a numeric estimation of lung cancer risk when evaluating if lung cancer screening is right for them, here is one calculator:    ShouldIScreen

## 2023-11-09 NOTE — TELEPHONE ENCOUNTER
Done.    Team: Can you let patient know (if needed)?      Thanks much,  Dr. Breanna Forrest MD / [facility]

## 2023-11-09 NOTE — PATIENT INSTRUCTIONS
Lung Cancer Screening   Frequently Asked Questions  If you are at high-risk for lung cancer, getting screened with low-dose computed tomography (LDCT) every year can help save your life. This handout offers answers to some of the most common questions about lung cancer screening. If you have other questions, please call [phone] ([phone]).     What is it?  Lung cancer screening uses special X-ray technology to create an image of your lung tissue. The exam is quick and easy and takes less than 10 seconds. We don t give you any medicine or use any needles. You can eat before and after the exam. You don t need to change your clothes as long as the clothing on your chest doesn t contain metal. But, you do need to be able to hold your breath for at least 6 seconds during the exam.    What is the goal of lung cancer screening?  The goal of lung cancer screening is to save lives. Many times, lung cancer is not found until a person starts having physical symptoms. Lung cancer screening can help detect lung cancer in the earliest stages when it may be easier to treat.    Who should be screened for lung cancer?  We suggest lung cancer screening for anyone who is at high-risk for lung cancer. You are in the high-risk group if you:      are between the ages of 55 and 79, and    have smoked at least 1 pack of cigarettes a day for 20 or more years, and    still smoke or have quit within the past 15 years.    However, if you have a new cough or shortness of breath, you should talk to your doctor before being screened.    Why does it matter if I have symptoms?  Certain symptoms can be a sign that you have a condition in your lungs that should be checked and treated by your doctor. These symptoms include fever, chest pain, a new or changing cough, shortness of breath that you have never felt before, coughing up blood or unexplained weight loss. Having any of these symptoms can greatly affect the results of lung  cancer screening.       Should all smokers get an LDCT lung cancer screening exam?  It depends. Lung cancer screening is for a very specific group of men and women who have a history of heavy smoking over a long period of time (see  Who should be screened for lung cancer  above).  I am in the high-risk group, but have been diagnosed with cancer in the past. Is LDCT lung cancer screening right for me?  In some cases, you should not have LDCT lung screening, such as when your doctor is already following your cancer with CT scan studies. Your doctor will help you decide if LDCT lung screening is right for you.  Do I need to have a screening exam every year?  Yes. If you are in the high-risk group described earlier, you should get an LDCT lung cancer screening exam every year until you are 79, or are no longer willing or able to undergo screening and possible procedures to diagnose and treat lung cancer.  How effective is LDCT at preventing death from lung cancer?  Studies have shown that LDCT lung cancer screening can lower the risk of death from lung cancer by 20 percent in people who are at high-risk.  What are the risks?  There are some risks and limitations of LDCT lung cancer screening. We want to make sure you understand the risks and benefits, so please let us know if you have any questions. Your doctor may want to talk with you more about these risks.    Radiation exposure: As with any exam that uses radiation, there is a very small increased risk of cancer. The amount of radiation in LDCT is small--about the same amount a person would get from a mammogram. Your doctor orders the exam when he or she feels the potential benefits outweigh the risks.    False negatives: No test is perfect, including LDCT. It is possible that you may have a medical condition, including lung cancer, that is not found during your exam. This is called a false negative result.    False positives and more testing: LDCT very often finds  something in the lung that could be cancer, but in fact is not. This is called a false positive result. False positive tests often cause anxiety. To make sure these findings are not cancer, you may need to have more tests. These tests will be done only if you give us permission. Sometimes patients need a treatment that can have side effects, such as a biopsy. For more information on false positives, see  What can I expect from the results?     Findings not related to lung cancer: Your LDCT exam also takes pictures of areas of your body next to your lungs. In a very small number of cases, the CT scan will show an abnormal finding in one of these areas, such as your kidneys, adrenal glands, liver or thyroid. This finding may not be serious, but you may need more tests. Your doctor can help you decide what other tests you may need, if any.  What can I expect from the results?  About 1 out of 4 LDCT exams will find something that may need more tests. Most of the time, these findings are lung nodules. Lung nodules are very small collections of tissue in the lung. These nodules are very common, and the vast majority--more than 97 percent--are not cancer (benign). Most are normal lymph nodes or small areas of scarring from past infections.  But, if a small lung nodule is found to be cancer, the cancer can be cured more than 90 percent of the time. To know if the nodule is cancer, we may need to get more images before your next yearly screening exam. If the nodule has suspicious features (for example, it is large, has an odd shape or grows over time), we will refer you to a specialist for further testing.  Will my doctor also get the results?  Yes. Your doctor will get a copy of your results.  Is it okay to keep smoking now that there s a cancer screening exam?  No. Tobacco is one of the strongest cancer-causing agents. It causes not only lung cancer, but other cancers and cardiovascular (heart) diseases as well. The damage  caused by smoking builds over time. This means that the longer you smoke, the higher your risk of disease. While it is never too late to quit, the sooner you quit, the better.  Where can I find help to quit smoking?  The best way to prevent lung cancer is to stop smoking. If you have already quit smoking, congratulations and keep it up! For help on quitting smoking, please call [employer] at 7-226-QUITNOW ([phone]) or the American [employer] Society at 1-821.320.9857 to find local resources near you.  One-on-one health coaching:  If you d prefer to work individually with a health care provider on tobacco cessation, we offer:      Medication Therapy Management:  Our specially trained pharmacists work closely with you and your doctor to help you quit smoking.  Call [phone] or [phone] (toll free).

## 2023-11-09 NOTE — TELEPHONE ENCOUNTER
Responded to the patient through MyChart.     Zoila Carr RN  [facility]

## 2023-11-29 ENCOUNTER — ANCILLARY PROCEDURE (OUTPATIENT)
Dept: CT IMAGING | Facility: CLINIC | Age: 53
End: 2023-11-29
Attending: FAMILY MEDICINE
Payer: COMMERCIAL

## 2023-11-29 DIAGNOSIS — Z87.891 PERSONAL HISTORY OF TOBACCO USE: ICD-10-CM

## 2023-11-29 PROCEDURE — 71271 CT THORAX LUNG CANCER SCR C-: CPT | Mod: GC | Performed by: RADIOLOGY

## 2023-12-10 ENCOUNTER — HEALTH MAINTENANCE LETTER (OUTPATIENT)
Age: 53
End: 2023-12-10

## 2024-02-02 DIAGNOSIS — K86.2 PANCREATIC CYST: Primary | ICD-10-CM

## 2024-02-02 SDOH — HEALTH STABILITY: PHYSICAL HEALTH: ON AVERAGE, HOW MANY MINUTES DO YOU ENGAGE IN EXERCISE AT THIS LEVEL?: 20 MIN

## 2024-02-02 SDOH — HEALTH STABILITY: PHYSICAL HEALTH: ON AVERAGE, HOW MANY DAYS PER WEEK DO YOU ENGAGE IN MODERATE TO STRENUOUS EXERCISE (LIKE A BRISK WALK)?: 1 DAY

## 2024-02-02 ASSESSMENT — SOCIAL DETERMINANTS OF HEALTH (SDOH): HOW OFTEN DO YOU GET TOGETHER WITH FRIENDS OR RELATIVES?: MORE THAN THREE TIMES A WEEK

## 2024-02-08 ASSESSMENT — PATIENT HEALTH QUESTIONNAIRE - PHQ9
SUM OF ALL RESPONSES TO PHQ QUESTIONS 1-9: 7
10. IF YOU CHECKED OFF ANY PROBLEMS, HOW DIFFICULT HAVE THESE PROBLEMS MADE IT FOR YOU TO DO YOUR WORK, TAKE CARE OF THINGS AT HOME, OR GET ALONG WITH OTHER PEOPLE: SOMEWHAT DIFFICULT
SUM OF ALL RESPONSES TO PHQ QUESTIONS 1-9: 7

## 2024-02-09 ENCOUNTER — OFFICE VISIT (OUTPATIENT)
Dept: FAMILY MEDICINE | Facility: CLINIC | Age: 54
End: 2024-02-09
Payer: COMMERCIAL

## 2024-02-09 VITALS
TEMPERATURE: 97.3 F | BODY MASS INDEX: 38.15 KG/M2 | DIASTOLIC BLOOD PRESSURE: 86 MMHG | HEART RATE: 72 BPM | SYSTOLIC BLOOD PRESSURE: 142 MMHG | HEIGHT: 70 IN | RESPIRATION RATE: 18 BRPM | WEIGHT: 266.5 LBS | OXYGEN SATURATION: 99 %

## 2024-02-09 DIAGNOSIS — F33.42 RECURRENT MAJOR DEPRESSIVE DISORDER, IN FULL REMISSION (H): ICD-10-CM

## 2024-02-09 DIAGNOSIS — Z00.00 ROUTINE GENERAL MEDICAL EXAMINATION AT A HEALTH CARE FACILITY: Primary | ICD-10-CM

## 2024-02-09 DIAGNOSIS — R73.03 PREDIABETES: ICD-10-CM

## 2024-02-09 DIAGNOSIS — I10 HYPERTENSION GOAL BP (BLOOD PRESSURE) < 130/80: ICD-10-CM

## 2024-02-09 DIAGNOSIS — K86.89 PANCREATIC MASS: ICD-10-CM

## 2024-02-09 PROBLEM — H61.91 SKIN LESION OF RIGHT EAR: Status: RESOLVED | Noted: 2022-11-04 | Resolved: 2024-02-09

## 2024-02-09 LAB
CA-I BLD-MCNC: 4.9 MG/DL (ref 4.4–5.2)
HBA1C MFR BLD: 5.9 % (ref 0–5.6)

## 2024-02-09 PROCEDURE — 36415 COLL VENOUS BLD VENIPUNCTURE: CPT | Performed by: FAMILY MEDICINE

## 2024-02-09 PROCEDURE — 80053 COMPREHEN METABOLIC PANEL: CPT | Performed by: FAMILY MEDICINE

## 2024-02-09 PROCEDURE — 90471 IMMUNIZATION ADMIN: CPT | Performed by: FAMILY MEDICINE

## 2024-02-09 PROCEDURE — 99396 PREV VISIT EST AGE 40-64: CPT | Mod: 25 | Performed by: FAMILY MEDICINE

## 2024-02-09 PROCEDURE — 80061 LIPID PANEL: CPT | Performed by: FAMILY MEDICINE

## 2024-02-09 PROCEDURE — 83036 HEMOGLOBIN GLYCOSYLATED A1C: CPT | Performed by: FAMILY MEDICINE

## 2024-02-09 PROCEDURE — 82330 ASSAY OF CALCIUM: CPT | Performed by: FAMILY MEDICINE

## 2024-02-09 PROCEDURE — 99214 OFFICE O/P EST MOD 30 MIN: CPT | Mod: 25 | Performed by: FAMILY MEDICINE

## 2024-02-09 PROCEDURE — 90746 HEPB VACCINE 3 DOSE ADULT IM: CPT | Performed by: FAMILY MEDICINE

## 2024-02-09 RX ORDER — BUPROPION HYDROCHLORIDE 100 MG/1
100 TABLET, EXTENDED RELEASE ORAL 2 TIMES DAILY
Qty: 180 TABLET | Refills: 4 | Status: SHIPPED | OUTPATIENT
Start: 2024-02-09

## 2024-02-09 RX ORDER — BUPROPION HYDROCHLORIDE 100 MG/1
TABLET, EXTENDED RELEASE ORAL
Qty: 180 TABLET | Refills: 3 | Status: CANCELLED | OUTPATIENT
Start: 2024-02-09

## 2024-02-09 RX ORDER — LOSARTAN POTASSIUM AND HYDROCHLOROTHIAZIDE 12.5; 5 MG/1; MG/1
1 TABLET ORAL DAILY
Qty: 30 TABLET | Refills: 1 | Status: SHIPPED | OUTPATIENT
Start: 2024-02-09 | End: 2024-03-18

## 2024-02-09 ASSESSMENT — PAIN SCALES - GENERAL: PAINLEVEL: NO PAIN (0)

## 2024-02-09 NOTE — PATIENT INSTRUCTIONS
"Learning About Being Physically Active  What is physical activity?     Being physically active means doing any kind of activity that gets your body moving.  The types of physical activity that can help you get fit and stay healthy include:  Aerobic or \"cardio\" activities. These make your heart beat faster and make you breathe harder, such as brisk walking, riding a bike, or running. They strengthen your heart and lungs and build up your endurance.  Strength training activities. These make your muscles work against, or \"resist,\" something. Examples include lifting weights or doing push-ups. These activities help tone and strengthen your muscles and bones.  Stretches. These let you move your joints and muscles through their full range of motion. Stretching helps you be more flexible.  Reaching a balance between these three types of physical activity is important because each one contributes to your overall fitness.  What are the benefits of being active?  Being active is one of the best things you can do for your health. It helps you to:  Feel stronger and have more energy to do all the things you like to do.  Focus better at school or work.  Feel, think, and sleep better.  Reach and stay at a healthy weight.  Lose fat and build lean muscle.  Lower your risk for serious health problems, including diabetes, heart attack, high blood pressure, and some cancers.  Keep your heart, lungs, bones, muscles, and joints strong and healthy.  How can you make being active part of your life?  Start slowly. Make it your long-term goal to get at least 30 minutes of exercise on most days of the week. Walking is a good choice. You also may want to do other activities, such as running, swimming, cycling, or playing tennis or team sports.  Pick activities that you like--ones that make your heart beat faster, your muscles stronger, and your muscles and joints more flexible. If you find more than one thing you like doing, do them all. You " "don't have to do the same thing every day.  Get your heart pumping every day. Any activity that makes your heart beat faster and keeps it at that rate for a while counts.  Here are some great ways to get your heart beating faster:  Go for a brisk walk, run, or hike.  Go for a swim or bike ride.  Take an online exercise class or dance.  Play a game of touch football, basketball, or soccer.  Play tennis, pickleball, or racquetball.  Climb stairs.  Even some household chores can be aerobic. Just do them at a faster pace. Raking or mowing the lawn, sweeping the garage, and vacuuming and cleaning your home all can help get your heart rate up.  Strengthen your muscles during the week. You don't have to lift heavy weights or grow big, bulky muscles to get stronger. Doing a few simple activities that make your muscles work against, or \"resist,\" something can help you get stronger. Aim for at least twice a week.  For example, you can:  Do push-ups or sit-ups, which use your own body weight as resistance.  Lift weights or dumbbells or use stretch bands at home or in a gym or community center.  Stretch your muscles often. Stretching will help you as you become more active. It can help you stay flexible and loosen tight muscles. It can also help improve your balance and posture and can be a great way to relax.  Be sure to stretch the muscles you'll be using when you work out. It's best to warm your muscles slightly before you stretch them. Walk or do some other light aerobic activity for a few minutes. Then start stretching.  When you stretch your muscles:  Do it slowly. Stretching is not about going fast or making sudden movements.  Don't push or bounce during a stretch.  Hold each stretch for at least 15 to 30 seconds, if you can. You should feel a stretch in the muscle, but not pain.  Breathe out as you do the stretch. Then breathe in as you hold the stretch. Don't hold your breath.  If you're worried about how more activity " "might affect your health, have a checkup before you start. Follow any special advice your doctor gives you for getting a smart start.  Where can you learn more?  Go to https://www.Xyo.net/patiented  Enter W332 in the search box to learn more about \"Learning About Being Physically Active.\"  Current as of: June 6, 2023               Content Version: 13.8    0577-6630 [employer].   Care instructions adapted under license by your healthcare professional. If you have questions about a medical condition or this instruction, always ask your healthcare professional. [employer] disclaims any warranty or liability for your use of this information.      Eating Healthy Foods: Care Instructions  With every meal, you can make healthy food choices. Try to eat a variety of fruits, vegetables, whole grains, lean proteins, and low-fat dairy products. This can help you get the right balance of nutrients, including vitamins and minerals. Small changes add up over time. You can start by adding one healthy food to your meals each day.    Try to make half your plate fruits and vegetables, one-fourth whole grains, and one-fourth lean proteins. Try including dairy with your meals.   Eat more fruits and vegetables. Try to have them with most meals and snacks.   Foods for healthy eating    Fruits    These can be fresh, frozen, canned, or dried.  Try to choose whole fruit rather than fruit juice.  Eat a variety of colors.    Vegetables    These can be fresh, frozen, canned, or dried.  Beans, peas, and lentils count too.    Whole grains    Choose whole-grain breads, cereals, and noodles.  Try brown rice.    Lean proteins    These can include lean meat, poultry, fish, and eggs.  You can also have tofu, beans, peas, lentils, nuts, and seeds.    Dairy    Try milk, yogurt, and cheese.  Choose low-fat or fat-free when you can.  If you need to, use lactose-free milk or fortified plant-based milk products, such as " "soy milk.    Water    Drink water when you're thirsty.  Limit sugar-sweetened drinks, including soda, fruit drinks, and sports drinks.  Where can you learn more?  Go to https://www.OLSET.net/patiented  Enter T756 in the search box to learn more about \"Eating Healthy Foods: Care Instructions.\"  Current as of: February 28, 2023               Content Version: 13.8    1487-7841 [employer].   Care instructions adapted under license by your healthcare professional. If you have questions about a medical condition or this instruction, always ask your healthcare professional. [employer] disclaims any warranty or liability for your use of this information.      Learning About Stress  What is stress?     Stress is your body's response to a hard situation. Your body can have a physical, emotional, or mental response. Stress is a fact of life for most people, and it affects everyone differently. What causes stress for you may not be stressful for someone else.  A lot of things can cause stress. You may feel stress when you go on a job interview, take a test, or run a race. This kind of short-term stress is normal and even useful. It can help you if you need to work hard or react quickly. For example, stress can help you finish an important job on time.  Long-term stress is caused by ongoing stressful situations or events. Examples of long-term stress include long-term health problems, ongoing problems at work, or conflicts in your family. Long-term stress can harm your health.  How does stress affect your health?  When you are stressed, your body responds as though you are in danger. It makes hormones that speed up your heart, make you breathe faster, and give you a burst of energy. This is called the fight-or-flight stress response. If the stress is over quickly, your body goes back to normal and no harm is done.  But if stress happens too often or lasts too long, it can have bad effects. " Long-term stress can make you more likely to get sick, and it can make symptoms of some diseases worse. If you tense up when you are stressed, you may develop neck, shoulder, or low back pain. Stress is linked to high blood pressure and heart disease.  Stress also harms your emotional health. It can make you merritt, tense, or depressed. Your relationships may suffer, and you may not do well at work or school.  What can you do to manage stress?  You can try these things to help manage stress:   Do something active. Exercise or activity can help reduce stress. Walking is a great way to get started. Even everyday activities such as housecleaning or yard work can help.  Try yoga or marco chi. These techniques combine exercise and meditation. You may need some training at first to learn them.  Do something you enjoy. For example, listen to music or go to a movie. Practice your hobby or do volunteer work.  Meditate. This can help you relax, because you are not worrying about what happened before or what may happen in the future.  Do guided imagery. Imagine yourself in any setting that helps you feel calm. You can use online videos, books, or a teacher to guide you.  Do breathing exercises. For example:  From a standing position, bend forward from the waist with your knees slightly bent. Let your arms dangle close to the floor.  Breathe in slowly and deeply as you return to a standing position. Roll up slowly and lift your head last.  Hold your breath for just a few seconds in the standing position.  Breathe out slowly and bend forward from the waist.  Let your feelings out. Talk, laugh, cry, and express anger when you need to. Talking with supportive friends or family, a counselor, or a syed leader about your feelings is a healthy way to relieve stress. Avoid discussing your feelings with people who make you feel worse.  Write. It may help to write about things that are bothering you. This helps you find out how much stress  "you feel and what is causing it. When you know this, you can find better ways to cope.  What can you do to prevent stress?  You might try some of these things to help prevent stress:  Manage your time. This helps you find time to do the things you want and need to do.  Get enough sleep. Your body recovers from the stresses of the day while you are sleeping.  Get support. Your family, friends, and community can make a difference in how you experience stress.  Limit your news feed. Avoid or limit time on social media or news that may make you feel stressed.  Do something active. Exercise or activity can help reduce stress. Walking is a great way to get started.  Where can you learn more?  Go to https://www.UniServity.net/patiented  Enter N032 in the search box to learn more about \"Learning About Stress.\"  Current as of: February 26, 2023               Content Version: 13.8    3011-1549 [employer].   Care instructions adapted under license by your healthcare professional. If you have questions about a medical condition or this instruction, always ask your healthcare professional. [employer] disclaims any warranty or liability for your use of this information.      Learning About Depression Screening  What is depression screening?  Depression screening is a way to see if you have depression symptoms. It may be done by a doctor or counselor. It's often part of a routine checkup. That's because your mental health is just as important as your physical health.  Depression is a mental health condition that affects how you feel, think, and act. You may:  Have less energy.  Lose interest in your daily activities.  Feel sad and grouchy for a long time.  Depression is very common. It affects people of all ages.  Many things can lead to depression. Some people become depressed after they have a stroke or find out they have a major illness like cancer or heart disease. The death of a loved one or a " "breakup may lead to depression. It can run in families. Most experts believe that a combination of inherited genes and stressful life events can cause it.  What happens during screening?  You may be asked to fill out a form about your depression symptoms. You and the doctor will discuss your answers. The doctor may ask you more questions to learn more about how you think, act, and feel.  What happens after screening?  If you have symptoms of depression, your doctor will talk to you about your options.  Doctors usually treat depression with medicines or counseling. Often, combining the two works best. Many people don't get help because they think that they'll get over the depression on their own. But people with depression may not get better unless they get treatment.  The cause of depression is not well understood. There may be many factors involved. But if you have depression, it's not your fault.  A serious symptom of depression is thinking about death or suicide. If you or someone you care about talks about this or about feeling hopeless, get help right away.  It's important to know that depression can be treated. Medicine, counseling, and self-care may help.  Where can you learn more?  Go to https://www.textmetix.net/patiented  Enter T185 in the search box to learn more about \"Learning About Depression Screening.\"  Current as of: June 25, 2023               Content Version: 13.8    5404-3968 [employer].   Care instructions adapted under license by your healthcare professional. If you have questions about a medical condition or this instruction, always ask your healthcare professional. [employer] disclaims any warranty or liability for your use of this information.      Substance Use Disorder: Care Instructions  Overview     You can improve your life and health by stopping your use of alcohol or drugs. When you don't drink or use drugs, you may feel and sleep better. You may get along " better with your family, friends, and coworkers. There are medicines and programs that can help with substance use disorder.  How can you care for yourself at home?  Here are some ways to help you stay sober and prevent relapse.  If you have been given medicine to help keep you sober or reduce your cravings, be sure to take it exactly as prescribed.  Talk to your doctor about programs that can help you stop using drugs or drinking alcohol.  Do not keep alcohol or drugs in your home.  Plan ahead. Think about what you'll say if other people ask you to drink or use drugs. Try not to spend time with people who drink or use drugs.  Use the time and money spent on drinking or drugs to do something that's important to you.  Preventing a relapse  Have a plan to deal with relapse. Learn to recognize changes in your thinking that lead you to drink or use drugs. Get help before you start to drink or use drugs again.  Try to stay away from situations, friends, or places that may lead you to drink or use drugs.  If you feel the need to drink alcohol or use drugs again, seek help right away. Call a trusted friend or family member. Some people get support from organizations such as Narcotics Anonymous or [employer] or from treatment facilities.  If you relapse, get help as soon as you can. Some people make a plan with another person that outlines what they want that person to do for them if they relapse. The plan usually includes how to handle the relapse and who to notify in case of relapse.  Don't give up. Remember that a relapse doesn't mean that you have failed. Use the experience to learn the triggers that lead you to drink or use drugs. Then quit again. Recovery is a lifelong process. Many people have several relapses before they are able to quit for good.  Follow-up care is a key part of your treatment and safety. Be sure to make and go to all appointments, and call your doctor if you are having problems. It's also a  "good idea to know your test results and keep a list of the medicines you take.  When should you call for help?   Call 911  anytime you think you may need emergency care. For example, call if you or someone else:    Has overdosed or has withdrawal signs. Be sure to tell the emergency workers that you are or someone else is using or trying to quit using drugs. Overdose or withdrawal signs may include:  Losing consciousness.  Seizure.  Seeing or hearing things that aren't there (hallucinations).     Is thinking or talking about suicide or harming others.   Where to get help 24 hours a day, 7 days a week   If you or someone you know talks about suicide, self-harm, a mental health crisis, a substance use crisis, or any other kind of emotional distress, get help right away. You can:    Call the Suicide and Crisis Lifeline at 988.     Call 8-994-853-TALK (1-112.558.4012).     Text HOME to 956868 to access the Crisis Text Line.   Consider saving these numbers in your phone.  Go to Lore.Thryve for more information or to chat online.  Call your doctor now or seek immediate medical care if:    You are having withdrawal symptoms. These may include nausea or vomiting, sweating, shakiness, and anxiety.   Watch closely for changes in your health, and be sure to contact your doctor if:    You have a relapse.     You need more help or support to stop.   Where can you learn more?  Go to https://www.Shoutlet.net/patiented  Enter H573 in the search box to learn more about \"Substance Use Disorder: Care Instructions.\"  Current as of: March 21, 2023               Content Version: 13.8    8216-5451 [employer].   Care instructions adapted under license by your healthcare professional. If you have questions about a medical condition or this instruction, always ask your healthcare professional. [employer] disclaims any warranty or liability for your use of this information.      Preventive Care Advice   This " is general advice given by our system to help you stay healthy. However, your care team may have specific advice just for you. Please talk to your care team about your preventive care needs.  Nutrition  Eat 5 or more servings of fruits and vegetables each day.  Try wheat bread, brown rice and whole grain pasta (instead of white bread, rice, and pasta).  Get enough calcium and vitamin D. Check the label on foods and aim for 100% of the RDA (recommended daily allowance).  Lifestyle  Exercise at least 150 minutes each week  (30 minutes a day, 5 days a week).  Do muscle strengthening activities 2 days a week. These help control your weight and prevent disease.  No smoking.  Wear sunscreen to prevent skin cancer.  Have a dental exam and cleaning every 6 months.  Yearly exams  See your health care team every year to talk about:  Any changes in your health.  Any medicines your care team has prescribed.  Preventive care, family planning, and ways to prevent chronic diseases.  Shots (vaccines)   HPV shots (up to age 26), if you've never had them before.  Hepatitis B shots (up to age 59), if you've never had them before.  COVID-19 shot: Get this shot when it's due.  Flu shot: Get a flu shot every year.  Tetanus shot: Get a tetanus shot every 10 years.  Pneumococcal, hepatitis A, and RSV shots: Ask your care team if you need these based on your risk.  Shingles shot (for age 50 and up)  General health tests  Diabetes screening:  Starting at age 35, Get screened for diabetes at least every 3 years.  If you are younger than age 35, ask your care team if you should be screened for diabetes.  Cholesterol test: At age 39, start having a cholesterol test every 5 years, or more often if advised.  Bone density scan (DEXA): At age 50, ask your care team if you should have this scan for osteoporosis (brittle bones).  Hepatitis C: Get tested at least once in your life.  STIs (sexually transmitted infections)  Before age 24: Ask your care  team if you should be screened for STIs.  After age 24: Get screened for STIs if you're at risk. You are at risk for STIs (including HIV) if:  You are sexually active with more than one person.  You don't use condoms every time.  You or a partner was diagnosed with a sexually transmitted infection.  If you are at risk for HIV, ask about PrEP medicine to prevent HIV.  Get tested for HIV at least once in your life, whether you are at risk for HIV or not.  Cancer screening tests  Cervical cancer screening: If you have a cervix, begin getting regular cervical cancer screening tests starting at age 21.  Breast cancer scan (mammogram): If you've ever had breasts, begin having regular mammograms starting at age 40. This is a scan to check for breast cancer.  Colon cancer screening: It is important to start screening for colon cancer at age 45.  Have a colonoscopy test every 10 years (or more often if you're at risk) Or, ask your provider about stool tests like a FIT test every year or Cologuard test every 3 years.  To learn more about your testing options, visit:   [email or web address].  For help making a decision, visit:   https://bit.ly/xo92709.  Prostate cancer screening test: If you have a prostate, ask your care team if a prostate cancer screening test (PSA) at age 55 is right for you.  Lung cancer screening: If you are a current or former smoker ages 50 to 80, ask your care team if ongoing lung cancer screenings are right for you.  For informational purposes only. Not to replace the advice of your health care provider. Copyright   2023 [employer]. All rights reserved. Clinically reviewed by the [facility] Transitions Program. [employer] [id number] - REV 01/24.

## 2024-02-09 NOTE — PROGRESS NOTES
"Preventive Care Visit  [facility]  Breanna Forrest MD, Family Medicine  Feb 9, 2024    Assessment & Plan     Marisa was seen today for physical.    Diagnoses and all orders for this visit:    Routine general medical examination at a health care facility  -     Comprehensive metabolic panel (BMP + Alb, Alk Phos, ALT, AST, Total. Bili, TP); Future  -     Ionized Calcium; Future  -     Lipid panel reflex to direct LDL Non-fasting; Future  -     Hemoglobin A1c; Future  -     Comprehensive metabolic panel (BMP + Alb, Alk Phos, ALT, AST, Total. Bili, TP)  -     Ionized Calcium  -     Lipid panel reflex to direct LDL Non-fasting  -     Hemoglobin A1c    Pancreatic mass  Comments:  Reviewed last note - 2024: Follow up would be MRI/clinic in one year (9/2023)    Depression with anxiety (H24)  Comments:  Stable on bupropion and sertraline  Continue as is  Refilled  Discussed diarrhea - unlikely sertraline related as has been on >10 yrs. Add psyllium  Orders:  -     sertraline (ZOLOFT) 50 MG tablet; Take 1 tablet (50 mg) by mouth daily  -     buPROPion (WELLBUTRIN SR) 100 MG 12 hr tablet; Take 1 tablet (100 mg) by mouth 2 times daily    Hypertension goal BP (blood pressure) < 130/80  Comments:  New diagnosis today 2/2024.  Start losartan-hctz  Follow up ~2 weeks with RN HTN visit  Orders:  -     losartan-hydrochlorothiazide (HYZAAR) 50-12.5 MG tablet; Take 1 tablet by mouth daily    Other orders  -     HEPATITIS B, ADULT 20+ (ENGERIX-B/RECOMBIVAX HB)  -     PRIMARY CARE FOLLOW-UP SCHEDULING; Future  -     RN HYPERTENSION MANAGEMENT TITRATION STANDING ORDER ( SITES ONLY)                  BMI  Estimated body mass index is 38.72 kg/m  as calculated from the following:    Height as of this encounter: 1.767 m (5' 9.57\").    Weight as of this encounter: 120.9 kg (266 lb 8 oz).       Counseling  Appropriate preventive services were discussed with this patient, including applicable screening as " appropriate for fall prevention, nutrition, physical activity, Tobacco-use cessation, weight loss and cognition.  Checklist reviewing preventive services available has been given to the patient.  Reviewed patient's diet, addressing concerns and/or questions.   Marisa Aguirre is at risk for lack of exercise and has been provided with information to increase physical activity for the benefit of Marisa Aguirre's well-being.   The patient was instructed to see the dentist every 6 months.   Marisa Aguirre is at risk for psychosocial distress and has been provided with information to reduce risk.   The patient's PHQ-9 score is consistent with mild depression. Marisa Aguirre was provided with information regarding depression.             Subjective   Marisa is a 53 year old, presenting for the following:  Physical        2/9/2024     2:00 PM   Additional Questions   Roomed by Kaye   Accompanied by Self         2/9/2024     2:00 PM   Patient Reported Additional Medications   Patient reports taking the following new medications None        Health Care Directive  Patient does not have a Health Care Directive or Living Will: Discussed advance care planning with patient; information given to patient to review.    HPI    53 year old.    Pancreas mass - has been stable  Is on sertraline and bupropion.  Good luck with that combo.  Has been on for years, since 2014.  Does have some more diarrhea.  Once a day, no blood. Over a year.  It's consistent.  Not all the time.  Has had colonoscopy; prep wasn't adequate.  Did Cologuard and was normal in 2022.   Blood pressure. Has tried to not be on a med.  Tried to lose weight. But knows now has to do something.  BP Readings from Last 3 Encounters:   02/09/24 (!) 142/86   08/01/23 (!) 150/94   05/02/23 (!) 147/108                  2/2/2024   General Health   How would you rate your overall physical health? Good   Feel stress (tense, anxious, or unable to sleep) Only a little   (!) STRESS CONCERN       2/2/2024   Nutrition   Three or more servings of calcium each day? Yes   Diet: Vegetarian/vegan   How many servings of fruit and vegetables per day? 4 or more   How many sweetened beverages each day? 0-1         2/2/2024   Exercise   Days per week of moderate/strenous exercise 1 day   Average minutes spent exercising at this level 20 min   (!) EXERCISE CONCERN      2/2/2024   Social Factors   Frequency of gathering with friends or relatives More than three times a week   Worry food won't last until get money to buy more No   Food not last or not have enough money for food? No   Do you have housing?  Yes   Are you worried about losing your housing? No   Lack of transportation? No   Unable to get utilities (heat,electricity)? No         2/2/2024   Fall Risk   Fallen 2 or more times in the past year? No   Trouble with walking or balance? No          2/2/2024   Dental   Dentist two times every year? (!) NO         2/2/2024   TB Screening   Were you born outside of [place]?  No       Today's PHQ-9 Score:       2/8/2024     3:53 PM   PHQ-9 SCORE   PHQ-9 Total Score MyChart 7 (Mild depression)   PHQ-9 Total Score 7         2/2/2024   Substance Use   Alcohol more than 3/day or more than 7/wk No   Do you use any other substances recreationally? (!) CANNABIS PRODUCTS     Social History     Tobacco Use    Smoking status: Former     Packs/day: .25     Types: Cigarettes     Start date: 7/15/2015     Passive exposure: Past    Smokeless tobacco: Never   Vaping Use    Vaping Use: Never used   Substance Use Topics    Alcohol use: Not Currently     Alcohol/week: 0.0 standard drinks of alcohol     Comment: ocassional    Drug use: Yes     Types: Marijuana     Comment: every once in awhile marijuana use.            No data to display                         2/2/2024   STI Screening   New sexual partner(s) since last STI/HIV test? No     History of abnormal Pap smear:         Latest Ref Rng & Units 9/23/2020     9:01 AM 9/23/2020     9:00 AM  "2/5/2015    12:00 AM   PAP / HPV   PAP (Historical)  NIL   NIL    HPV 16 DNA NEG^Negative  Negative     HPV 18 DNA NEG^Negative  Negative     Other HR HPV NEG^Negative  Negative       The 10-year ASCVD risk score (Elizabeth CRESPO, et al., 2019) is: 3.4%    Values used to calculate the score:      Age: 53 years      Sex: Female      Is Non- : No      Diabetic: No      Tobacco smoker: No      Systolic Blood Pressure: 142 mmHg      Is BP treated: No      HDL Cholesterol: 39.8 mg/dL      Total Cholesterol: 237 mg/dL           Reviewed and updated as needed this visit by Provider                      Review of Systems       Objective    Exam  BP (!) 142/86 (BP Location: Right arm, Patient Position: Sitting, Cuff Size: Adult Large)   Pulse 72   Temp 97.3  F (36.3  C) (Temporal)   Resp 18   Ht 1.767 m (5' 9.57\")   Wt 120.9 kg (266 lb 8 oz)   LMP 12/13/2023 (Approximate)   SpO2 99%   Breastfeeding No   BMI 38.72 kg/m     Estimated body mass index is 38.72 kg/m  as calculated from the following:    Height as of this encounter: 1.767 m (5' 9.57\").    Weight as of this encounter: 120.9 kg (266 lb 8 oz).    Physical Exam  GENERAL: alert and no distress  EYES: Eyes grossly normal to inspection, PERRL and conjunctivae and sclerae normal  HENT: ear canals and TM's normal, nose and mouth without ulcers or lesions  NECK: no adenopathy, no asymmetry, masses, or scars  RESP: lungs clear to auscultation - no rales, rhonchi or wheezes  CV: regular rate and rhythm, normal S1 S2, no S3 or S4, no murmur, click or rub, no peripheral edema  ABDOMEN: soft, nontender, no hepatosplenomegaly, no masses and bowel sounds normal  MS: no gross musculoskeletal defects noted, no edema  SKIN: no suspicious lesions or rashes  NEURO: Normal strength and tone, mentation intact and speech normal  PSYCH: mentation appears normal, affect normal/bright      Signed Electronically by: Breanna Forrest MD    Answers submitted by the " patient for this visit:  Patient Health Questionnaire (Submitted on 2/8/2024)  If you checked off any problems, how difficult have these problems made it for you to do your work, take care of things at home, or get along with other people?: Somewhat difficult  PHQ9 TOTAL SCORE: 7

## 2024-02-10 LAB
ALBUMIN SERPL BCG-MCNC: 4.7 G/DL (ref 3.5–5.2)
ALP SERPL-CCNC: 93 U/L (ref 40–150)
ALT SERPL W P-5'-P-CCNC: 24 U/L (ref 0–70)
ANION GAP SERPL CALCULATED.3IONS-SCNC: 13 MMOL/L (ref 7–15)
AST SERPL W P-5'-P-CCNC: 23 U/L (ref 0–45)
BILIRUB SERPL-MCNC: 0.4 MG/DL
BUN SERPL-MCNC: 9.4 MG/DL (ref 6–20)
CALCIUM SERPL-MCNC: 9.8 MG/DL (ref 8.6–10)
CHLORIDE SERPL-SCNC: 102 MMOL/L (ref 98–107)
CHOLEST SERPL-MCNC: 251 MG/DL
CREAT SERPL-MCNC: 0.83 MG/DL (ref 0.51–1.17)
DEPRECATED HCO3 PLAS-SCNC: 26 MMOL/L (ref 22–29)
EGFRCR SERPLBLD CKD-EPI 2021: 84 ML/MIN/1.73M2
FASTING STATUS PATIENT QL REPORTED: ABNORMAL
GLUCOSE SERPL-MCNC: 83 MG/DL (ref 70–99)
HDLC SERPL-MCNC: 48 MG/DL
LDLC SERPL CALC-MCNC: 150 MG/DL
NONHDLC SERPL-MCNC: 203 MG/DL
POTASSIUM SERPL-SCNC: 4.2 MMOL/L (ref 3.4–5.3)
PROT SERPL-MCNC: 8.2 G/DL (ref 6.4–8.3)
SODIUM SERPL-SCNC: 141 MMOL/L (ref 135–145)
TRIGL SERPL-MCNC: 266 MG/DL

## 2024-02-12 PROBLEM — R73.03 PREDIABETES: Status: ACTIVE | Noted: 2024-02-12

## 2024-02-12 PROBLEM — I10 HYPERTENSION GOAL BP (BLOOD PRESSURE) < 130/80: Status: ACTIVE | Noted: 2024-02-12

## 2024-02-15 ENCOUNTER — TELEPHONE (OUTPATIENT)
Dept: FAMILY MEDICINE | Facility: CLINIC | Age: 54
End: 2024-02-15
Payer: COMMERCIAL

## 2024-02-15 NOTE — TELEPHONE ENCOUNTER
[facility] referral from: [facility] visit (referral by provider)    MTM referral outreach attempt #2 on February 15, 2024 at 1:32 PM      Outcome: Patient not reachable after several attempts, will route to [place] Pharmacist/Provider as an FYI.  [facility] scheduling number is .  Thank you for the referral.    Use private pay for the carrier/Plan on the flowsheet      mytrax Message Sent  Breanna Avery CPhT  [place]

## 2024-02-16 ENCOUNTER — MYC MEDICAL ADVICE (OUTPATIENT)
Dept: FAMILY MEDICINE | Facility: CLINIC | Age: 54
End: 2024-02-16
Payer: COMMERCIAL

## 2024-02-20 NOTE — MR AVS SNAPSHOT
"              After Visit Summary   1/17/2018    Pavithra Aguirre    MRN: [id number]           Patient Information     Date Of Birth          1970        Visit Information        Provider Department      1/17/2018 4:00 PM Lorrie Delvalle DO [facility]        Today's Diagnoses     Strain of deltoid muscle, initial encounter    -  1       Follow-ups after your visit        Who to contact     Please call your clinic at [phone] to:    Ask questions about your health    Make or cancel appointments    Discuss your medicines    Learn about your test results    Speak to your doctor   If you have compliments or concerns about an experience at your clinic, or if you wish to file a complaint, please contact [facility] Patient Relations at [phone] or email us at [email or web address]         Additional Information About Your Visit        MyChart Information     [employer] gives you secure access to your electronic health record. If you see a primary care provider, you can also send messages to your care team and make appointments. If you have questions, please call your primary care clinic.  If you do not have a primary care provider, please call [phone] and they will assist you.      [employer] is an electronic gateway that provides easy, online access to your medical records. With [employer], you can request a clinic appointment, read your test results, renew a prescription or communicate with your care team.     To access your existing account, please contact your [facility] or call [phone] for assistance.        Care EveryWhere ID     This is your Care EveryWhere ID. This could be used by other organizations to access your [facility] medical records  [id number]        Your Vitals Were     Pulse Temperature Respirations Height Pulse Oximetry Breastfeeding?    82 98.7  F (37.1  C) (Oral) 16 5' 10\" (177.8 cm) 99% No    BMI " Faxed off [employer] papers to fax:[phone] , will scan copy in chart, pt also given copy    (Body Mass Index)                   34.55 kg/m2            Blood Pressure from Last 3 Encounters:   01/17/18 130/84   10/11/17 127/84   01/18/16 108/67    Weight from Last 3 Encounters:   01/17/18 240 lb 12.8 oz (109.2 kg)   10/11/17 228 lb 3.2 oz (103.5 kg)   01/18/16 248 lb 12.8 oz (112.9 kg)              Today, you had the following     No orders found for display       Primary Care Provider Office Phone # Fax #    Tracey Guidry [phone]       [address] [address]        Equal Access to Services     JOVANNA HERNANDEZ : Albert Reyes, charleen rodríguez, los landeros, say cabral. So [facility] [phone].    ATENCIÓN: Si habla español, tiene a anna disposición servicios gratuitos de asistencia lingüística. Llame al [phone].    We comply with applicable federal civil rights laws and [place] laws. We do not discriminate on the basis of race, color, national origin, age, disability, sex, sexual orientation, or gender identity.            Thank you!     Thank you for choosing [facility]  for your care. Our goal is always to provide you with excellent care. Hearing back from our patients is one way we can continue to improve our services. Please take a few minutes to complete the written survey that you may receive in the mail after your visit with us. Thank you!             Your Updated Medication List - Protect others around you: Learn how to safely use, store and throw away your medicines at www.disposemymeds.org.          This list is accurate as of: 1/17/18 11:59 PM.  Always use your most recent med list.                   Brand Name Dispense Instructions for use Diagnosis    sertraline 50 MG tablet    ZOLOFT    90 tablet    Take 1 tablet (50 mg) by mouth daily    Depression with anxiety

## 2024-03-06 NOTE — TELEPHONE ENCOUNTER
Noted.    No further action needed from triage.    DAVON RojasN, RN-BC  [facility]

## 2024-03-18 ENCOUNTER — TELEPHONE (OUTPATIENT)
Dept: FAMILY MEDICINE | Facility: CLINIC | Age: 54
End: 2024-03-18

## 2024-03-18 ENCOUNTER — ALLIED HEALTH/NURSE VISIT (OUTPATIENT)
Dept: FAMILY MEDICINE | Facility: CLINIC | Age: 54
End: 2024-03-18
Payer: COMMERCIAL

## 2024-03-18 VITALS
WEIGHT: 247 LBS | BODY MASS INDEX: 35.36 KG/M2 | HEART RATE: 63 BPM | SYSTOLIC BLOOD PRESSURE: 138 MMHG | TEMPERATURE: 98 F | HEIGHT: 70 IN | DIASTOLIC BLOOD PRESSURE: 89 MMHG | OXYGEN SATURATION: 100 % | RESPIRATION RATE: 20 BRPM

## 2024-03-18 DIAGNOSIS — I10 HYPERTENSION GOAL BP (BLOOD PRESSURE) < 130/80: Primary | ICD-10-CM

## 2024-03-18 LAB
ANION GAP SERPL CALCULATED.3IONS-SCNC: 10 MMOL/L (ref 7–15)
BUN SERPL-MCNC: 10.7 MG/DL (ref 6–20)
CALCIUM SERPL-MCNC: 9.9 MG/DL (ref 8.6–10)
CHLORIDE SERPL-SCNC: 101 MMOL/L (ref 98–107)
CREAT SERPL-MCNC: 0.9 MG/DL (ref 0.51–1.17)
DEPRECATED HCO3 PLAS-SCNC: 28 MMOL/L (ref 22–29)
EGFRCR SERPLBLD CKD-EPI 2021: 76 ML/MIN/1.73M2
GLUCOSE SERPL-MCNC: 101 MG/DL (ref 70–99)
POTASSIUM SERPL-SCNC: 4.4 MMOL/L (ref 3.4–5.3)
SODIUM SERPL-SCNC: 139 MMOL/L (ref 135–145)

## 2024-03-18 PROCEDURE — 99207 PR NO CHARGE NURSE ONLY: CPT

## 2024-03-18 PROCEDURE — 80048 BASIC METABOLIC PNL TOTAL CA: CPT

## 2024-03-18 PROCEDURE — 36415 COLL VENOUS BLD VENIPUNCTURE: CPT

## 2024-03-18 RX ORDER — HYDROCHLOROTHIAZIDE 12.5 MG/1
12.5 TABLET ORAL DAILY
Status: SHIPPED
Start: 2024-03-18 | End: 2024-03-19

## 2024-03-18 RX ORDER — LOSARTAN POTASSIUM 50 MG/1
50 TABLET ORAL DAILY
Status: SHIPPED
Start: 2024-03-18 | End: 2024-03-19 | Stop reason: DRUGHIGH

## 2024-03-18 NOTE — PROGRESS NOTES
"RN Hypertension Management and Prevention Visit  [facility]  Mar 18, 2024    This RN HTN visit occurred with writer and ELIZA Holt RN.    ASSESSMENT:  Patient is NOT MEETING BLOOD PRESSURE GOAL and MEETS CRITERIA for additional titration with prescribed medication.   losartan (cozaar) will be adjusted and patient will follow up with RN in 2-4 weeks. 3/18/24 BMP result is normal. Losartan will be increased to 100 mg once daily. New order sent to pharmacy.  Patient updated via Categorical.      INTERVENTION:   Education provided today on:  Hypertension risk factor modification  Opportunities for ongoing education and support in hypertension risk factor modification were discussed      Patient verbalized understanding of concepts discussed and recommendations provided today.    PLAN:  Discussed need for BMP to result.  Patient prefers communication via [employer] as to titration plan.  Writer discussed with patient need to split combination pill into two separate tablets for Losartan and Hydrochlorothiazide.    FOLLOW-UP:  Chart routed to referring provider.  Ongoing plan for education and support: Patient expressed knowledge of what hypertension is, how it affects her health, lifestyle modifications she is actively pursuing to help lower BP, along with encouraging patient to contact us with any questions or concerns.      Any blood pressure medication dose changes were made via the Standing Orders.  A copy of this encounter was shared with the provider.  [place] 53 year old presents today for B/P check and possible medication titration related to Hypertension - Stage 1.  The patient's Blood Pressure Goal is < 130/80 .    Marisa Aguirre is accompanied by self.    Patient's blood pressure management related comments/concerns: Not checking BP at home.  Donated blood about 3 weeks ago and blood pressure reading was \"good.\"  Has home BP monitor.  Patient's emotional response to " Hypertension: expresses readiness to learn    Patient would like this visit to be focused around the following hypertension-related behaviors and goals:  B/P check and medication titration    Patient Problem List and Family Medical History reviewed for relevant medical history, current medical status, and HTN risk factors.     Current Hypertension Management per Patient:  Current medication list reviewed with patient:  Yes    Taking blood pressure medication:  Yes   Taking as prescribed: Yes  Missed Doses: Yes  Do you have any difficulty affording your medications? No     Patient blood pressure self-monitoring as follows:  patient does not check blood pressure at home  Home BP monitor and cuff: Yes  Home BP results:  None to review      Meeting goal:  No      Vitals:  LMP 12/13/2023 (Approximate)   Last 3 blood pressure readings:   BP Readings from Last 3 Encounters:   02/09/24 (!) 142/86   08/01/23 (!) 150/94   05/02/23 (!) 147/108           History   Smoking Status    Former    Packs/day: 0.25    Types: Cigarettes    Start date: 7/15/2015    Quit date: 1/1/2018   Smokeless Tobacco    Never       The following was reported by the patient:  Reviewed patient self hypertension management skills.  Patient reports they have made/continued the following changes to:  Diet:  Food Intake:  reduce sodium intake, weight reduction, and joined [employer]  Biggest challenge to healthy eating/low sodium: eating out  Physical activity assessment:  Frequency: 4 days/week, walking  Hypertension risk factors noted:  None    Patient reported symptoms since last visit:  Any recent health service and resource utilization related to blood pressure or syncope?  None    Patient reports new/worsening:    no new or worsening symptoms reported by the patient    Labs:  Labs since last visit  are not pending  Lab Results   Component Value Date     02/09/2024     02/05/2015    POTASSIUM 4.2 02/09/2024    POTASSIUM 4.2 02/05/2015  "   CHLORIDE 102 02/09/2024    CHLORIDE 106 02/05/2015    LI 9.8 02/09/2024    LI 9.8 02/05/2015    CO2 26 02/09/2024    CO2 29 02/05/2015    BUN 9.4 02/09/2024    BUN 12 02/05/2015    CR 0.83 02/09/2024    CR 0.65 02/05/2015    GLC 83 02/09/2024    GLC 97 02/05/2015         Socio/Economic/Cultural Considerations   Support system: family, spouse/significant other, children, and friend(s)  Cultural Influences/Ethnic Background:  Not  or   Family history of hypertension.    Health Literacy/Numeracy:  \"With high blood pressure, it's helpful to use forms and log books to write down your blood pressure and the time of day that you checked it.  With this in mind how confident are you at filling out medical forms, such as these, by yourself? :   Extremely       HTN knowledge and skills assessment:   Patient is knowledgeable in blood pressure management concepts related to: diet, physical activity assessment, tobacco/nicotine cessation, hypertension risk factors , and relevant cormorbidities  Patient needs further education on the following HTN management concepts:  None  Barriers to Learning Assessment: No Barrier    "

## 2024-03-18 NOTE — TELEPHONE ENCOUNTER
Patient on RN schedule today for RN HTN visit.    Med list updated to separate Losartan from hydrochlorothiazide per previous discussion with Dr. Forrest.    DAVON RojasN, RN-BC  [facility]

## 2024-03-19 ENCOUNTER — MYC MEDICAL ADVICE (OUTPATIENT)
Dept: FAMILY MEDICINE | Facility: CLINIC | Age: 54
End: 2024-03-19
Payer: COMMERCIAL

## 2024-03-19 DIAGNOSIS — I10 HYPERTENSION GOAL BP (BLOOD PRESSURE) < 130/80: ICD-10-CM

## 2024-03-19 RX ORDER — LOSARTAN POTASSIUM 100 MG/1
100 TABLET ORAL DAILY
Qty: 90 TABLET | Refills: 0 | Status: SHIPPED | OUTPATIENT
Start: 2024-03-19 | End: 2024-03-20

## 2024-03-19 RX ORDER — HYDROCHLOROTHIAZIDE 12.5 MG/1
12.5 TABLET ORAL DAILY
Qty: 90 TABLET | Refills: 1 | Status: SHIPPED | OUTPATIENT
Start: 2024-03-19 | End: 2024-03-20

## 2024-03-20 RX ORDER — LOSARTAN POTASSIUM 100 MG/1
100 TABLET ORAL DAILY
Qty: 90 TABLET | Refills: 0 | Status: SHIPPED | OUTPATIENT
Start: 2024-03-20 | End: 2024-04-03

## 2024-03-20 RX ORDER — HYDROCHLOROTHIAZIDE 12.5 MG/1
12.5 TABLET ORAL DAILY
Qty: 90 TABLET | Refills: 1 | Status: SHIPPED | OUTPATIENT
Start: 2024-03-20 | End: 2024-04-03

## 2024-04-09 DIAGNOSIS — I10 HYPERTENSION GOAL BP (BLOOD PRESSURE) < 130/80: ICD-10-CM

## 2024-04-09 RX ORDER — LOSARTAN POTASSIUM AND HYDROCHLOROTHIAZIDE 12.5; 5 MG/1; MG/1
1 TABLET ORAL DAILY
Qty: 90 TABLET | Refills: 4 | Status: SHIPPED | OUTPATIENT
Start: 2024-04-09

## 2024-04-16 ENCOUNTER — TELEPHONE (OUTPATIENT)
Dept: FAMILY MEDICINE | Facility: CLINIC | Age: 54
End: 2024-04-16
Payer: COMMERCIAL

## 2024-04-16 NOTE — TELEPHONE ENCOUNTER
No answer. LVM.     Triage or TCs -- Patient needs to be R/S'd to a day when the [facility] RN schedule is open as that is when RNs who mange these protocol visits are in clinic.     Patient was placed on the MA schedule by an outside [facility] member for an RN HTN visit. This visit cannot just be switched to the RN schedule as it is on a Friday and there are no RN's in clinic on Fridays for these protocol visits.     Zoila Carr, [facility]  [facility]

## 2024-04-22 ENCOUNTER — ALLIED HEALTH/NURSE VISIT (OUTPATIENT)
Dept: FAMILY MEDICINE | Facility: CLINIC | Age: 54
End: 2024-04-22
Payer: COMMERCIAL

## 2024-04-22 VITALS
OXYGEN SATURATION: 100 % | RESPIRATION RATE: 16 BRPM | HEART RATE: 57 BPM | TEMPERATURE: 98 F | SYSTOLIC BLOOD PRESSURE: 126 MMHG | DIASTOLIC BLOOD PRESSURE: 83 MMHG

## 2024-04-22 DIAGNOSIS — I10 HYPERTENSION GOAL BP (BLOOD PRESSURE) < 130/80: Primary | ICD-10-CM

## 2024-04-22 PROCEDURE — 99207 PR NO CHARGE NURSE ONLY: CPT

## 2024-04-22 ASSESSMENT — PAIN SCALES - GENERAL: PAINLEVEL: NO PAIN (0)

## 2024-04-22 NOTE — PROGRESS NOTES
Dr. Forrest - Patient seen for RN HTN visit. Is it appropriate for us to titrate her losartan this close to goal and given home readings? Labs stable, no new sx complaints. Advisement appreciated.    --------------------     In-clinic BP readings are:  128/85  126/83    Home BP readings are:  Systolic -113 and Diastolic BP 67-69 (consistently in this range almost every day)      DAVON MichaelsN, RN (she/her)  [facility] RN

## 2024-04-22 NOTE — PROGRESS NOTES
RN Hypertension Management and Prevention Visit  [facility]  Apr 22, 2024    ASSESSMENT:  Patient is MEETING BLOOD PRESSURE GOAL.  Will schedule recheck for 2-4 weeks with RN.    INTERVENTION:   Education provided today on:  Opportunities for ongoing education and support in hypertension risk factor modification were discussed    Patient verbalized understanding of concepts discussed and recommendations provided today.    PLAN:  AVS printed and provided to patient today.    FOLLOW-UP:  Chart routed to referring provider.  Ongoing plan for education and support: Await provider recommendation on whether or not titration is appropriate    Any blood pressure medication dose changes were made via the Standing Orders.  A copy of this encounter was shared with the provider.  John Holt RN    Marisa 54 year old presents today for B/P check and possible medication titration related to Hypertension - Stage 1.  The patient's Blood Pressure Goal is < 130/80 .    Marisa is accompanied by self.    Patient's blood pressure management related comments/concerns: no noted concerns but does state her BP is better at home  Patient's emotional response to Hypertension: expresses readiness to learn    Patient would like this visit to be focused around the following hypertension-related behaviors and goals:  B/P check and medication titration and home blood pressure monitoring  Home -113/67-69    Patient Problem List and Family Medical History reviewed for relevant medical history, current medical status, and HTN risk factors.     Current Hypertension Management per Patient:  Current medication list reviewed with patient:  Yes   Taking blood pressure medication:  Yes   Taking as prescribed: Yes  Missed Doses: No  Do you have any difficulty affording your medications? No    Patient blood pressure self-monitoring as follows:  blood pressure log  Home BP monitor and cuff: Yes  Home BP results: Patient  blood pressure log reviewed.  Patient reported range of home BP results, see above      Meeting goal:  No      Vitals:  /83 (BP Location: Right arm, Patient Position: Sitting, Cuff Size: Adult Large)   Pulse 57   Temp 98  F (36.7  C) (Temporal)   Resp 16   LMP 03/24/2024 (Exact Date)   SpO2 100%   Last 3 blood pressure readings:   BP Readings from Last 3 Encounters:   04/22/24 126/83   03/18/24 138/89   02/09/24 (!) 142/86         4/22/2024   AMB PT REPORTED BLOOD PRESSURE   Systolic (Patient Reported) 113   Diastolic (Patient Reported) 69     History   Smoking Status    Former    Packs/day: 0.25    Types: Cigarettes    Start date: 7/15/2015    Quit date: 1/1/2018   Smokeless Tobacco    Never       The following was reported by the patient:  Reviewed patient self hypertension management skills.  Patient reports they have made/continued the following changes to:  Diet:  Food Intake:  reduce sodium intake and weight reduction  Biggest challenge to healthy eating/low sodium: eating out and her partner stocking more snacks in the house    Patient reported symptoms since last visit:  Any recent health service and resource utilization related to blood pressure or syncope?  None  Patient reports new/worsening:    no new or worsening symptoms reported by the patient    Labs:  Labs since last visit  are not pending  Lab Results   Component Value Date     03/18/2024     02/05/2015    POTASSIUM 4.4 03/18/2024    POTASSIUM 4.2 02/05/2015    CHLORIDE 101 03/18/2024    CHLORIDE 106 02/05/2015    LI 9.9 03/18/2024    LI 9.8 02/05/2015    CO2 28 03/18/2024    CO2 29 02/05/2015    BUN 10.7 03/18/2024    BUN 12 02/05/2015    CR 0.90 03/18/2024    CR 0.65 02/05/2015     03/18/2024    GLC 97 02/05/2015         Socio/Economic/Cultural Considerations     Support system: family, spouse/significant other, children, and friend(s)  Cultural Influences/Ethnic Background:  Not  or   Family history  "of hypertension.    Health Literacy/Numeracy:  \"With high blood pressure, it's helpful to use forms and log books to write down your blood pressure and the time of day that you checked it.  With this in mind how confident are you at filling out medical forms, such as these, by yourself? :   Extremely      HTN knowledge and skills assessment:   Patient is knowledgeable in blood pressure management concepts related to: diet, physical activity assessment, and hypertension risk factors   Patient needs further education on the following HTN management concepts:  none  Barriers to Learning Assessment: No Barrier    "

## 2024-04-24 NOTE — PROGRESS NOTES
Thanks John.    I agree that I would not adjust her medications given those readings.    Dr. Breanna Forrest MD / [facility]

## 2024-06-18 DIAGNOSIS — F33.42 RECURRENT MAJOR DEPRESSIVE DISORDER, IN FULL REMISSION (H): ICD-10-CM

## 2024-06-18 NOTE — TELEPHONE ENCOUNTER
"Request for medication refill:     sertraline (ZOLOFT) 50 MG tablet       Providers if patient needs an appointment and you are willing to give a one month supply please refill for one month and  send a letter/MyChart using \".SMILLIMITEDREFILL\" .smillimited and route chart to \"[facility] \" (Giving one month refill in non controlled medications is strongly recommended before denial)    If refill has been denied, meaning absolutely no refills without visit, please complete the smart phrase \".smirxrefuse\" and route it to the \"[facility] MED REFILLS\"  pool to inform the patient and the pharmacy.    Rupal Barbosa MA      "

## 2024-07-03 ENCOUNTER — PATIENT OUTREACH (OUTPATIENT)
Dept: GASTROENTEROLOGY | Facility: CLINIC | Age: 54
End: 2024-07-03
Payer: COMMERCIAL

## 2024-07-03 NOTE — TELEPHONE ENCOUNTER
Pt sent [employer] asking to move her virtual visit appt on 8/5 with Dr Fleming. Offered alternate date of 8/19 at 1pm, virtual visit. Pt in agreement, msg routed to clinic coordinator to reschedule. Pt will call imaging scheduling to make MRI appt within one week of new clinic date.    Irena Hopper RN, BSN,   [facility]  Care coordinator

## 2024-07-17 ENCOUNTER — ANCILLARY PROCEDURE (OUTPATIENT)
Dept: MAMMOGRAPHY | Facility: CLINIC | Age: 54
End: 2024-07-17
Attending: FAMILY MEDICINE
Payer: COMMERCIAL

## 2024-07-17 DIAGNOSIS — Z12.31 VISIT FOR SCREENING MAMMOGRAM: ICD-10-CM

## 2024-07-17 PROCEDURE — 77067 SCR MAMMO BI INCL CAD: CPT | Performed by: STUDENT IN AN ORGANIZED HEALTH CARE EDUCATION/TRAINING PROGRAM

## 2024-07-17 PROCEDURE — 77063 BREAST TOMOSYNTHESIS BI: CPT | Performed by: STUDENT IN AN ORGANIZED HEALTH CARE EDUCATION/TRAINING PROGRAM

## 2024-08-14 ENCOUNTER — ANCILLARY PROCEDURE (OUTPATIENT)
Dept: MRI IMAGING | Facility: CLINIC | Age: 54
End: 2024-08-14
Attending: INTERNAL MEDICINE
Payer: COMMERCIAL

## 2024-08-14 DIAGNOSIS — K86.2 PANCREATIC CYST: ICD-10-CM

## 2024-08-14 PROCEDURE — 74183 MRI ABD W/O CNTR FLWD CNTR: CPT | Performed by: STUDENT IN AN ORGANIZED HEALTH CARE EDUCATION/TRAINING PROGRAM

## 2024-08-14 PROCEDURE — A9585 GADOBUTROL INJECTION: HCPCS | Performed by: STUDENT IN AN ORGANIZED HEALTH CARE EDUCATION/TRAINING PROGRAM

## 2024-08-14 RX ORDER — GADOBUTROL 604.72 MG/ML
15 INJECTION INTRAVENOUS ONCE
Status: COMPLETED | OUTPATIENT
Start: 2024-08-14 | End: 2024-08-14

## 2024-08-14 RX ADMIN — GADOBUTROL 11 ML: 604.72 INJECTION INTRAVENOUS at 15:01

## 2024-08-19 ENCOUNTER — VIRTUAL VISIT (OUTPATIENT)
Dept: GASTROENTEROLOGY | Facility: CLINIC | Age: 54
End: 2024-08-19
Attending: INTERNAL MEDICINE
Payer: COMMERCIAL

## 2024-08-19 ENCOUNTER — TELEPHONE (OUTPATIENT)
Dept: GASTROENTEROLOGY | Facility: CLINIC | Age: 54
End: 2024-08-19
Payer: COMMERCIAL

## 2024-08-19 DIAGNOSIS — K86.2 PANCREAS CYST: Primary | ICD-10-CM

## 2024-08-19 PROCEDURE — G2211 COMPLEX E/M VISIT ADD ON: HCPCS | Performed by: INTERNAL MEDICINE

## 2024-08-19 PROCEDURE — 99213 OFFICE O/P EST LOW 20 MIN: CPT | Mod: 95 | Performed by: INTERNAL MEDICINE

## 2024-08-19 NOTE — PROGRESS NOTES
"Virtual Visit Details    Type of service:  Video Visit     Originating Location (pt. Location): Home    D[place] Location (provider location):  Off-site  [place] [facility] [place]  Pavithra Aguirre [id number]     SUBJECTIVE:  53 yo female being seen for pancreatic cyst surveillance. See clinic note from 4/24/23 for details.  The largest cyst was initially seen on CT chest for lung cancer screening 11/11/22. She has had subsequent MRIs and EUS for surveillance, with the most recent EUS 8/1/23 showing a 26 x 19 mm cyst with multiple internal structures suggestive of internal cysts. Fluid analysis on two occasions has returned low CEA (2.6 and then < 0.2) with elevated amylase levels. Cytology has been benign/acellular. The most recent fluid analysis was sent also for glucose and PancraGen testing.   The glucose was low suggestive of the presence of mucin. The DNA analsysis showed no kRas or GNAS mutations and no DEB of tumor suppressor genes. This was interpreted by the send out lab as \"benign\" with a low risk of malignant behavior in the next 3 years.    Of note, she has been seen by surgical oncology (Dr. Daley, 5/2/23) who recommended conservative management unless there was more evidence than than size that this was a high-risk lesion.    She is feeling well and denies abdominal pain, diarrhea, weight loss or jaundice.    MRI prior to this visit was read as:  IMPRESSION:   1. Stable multicystic lesion with layering debris in the pancreatic  tail. No worrisome features. No significant change since 12/27/2022.  2. No significant change in 5 mm nonenhancing cystic foci in the  pancreatic head, likely small branch IPMN.  3. Additional findings similar to prior.    This was measured as 3.0 x 2.4 cm.  Again, prior MRI and EUS had identified additional tiny cysts in the tail.           OBJECTIVE:  VS: There were no vitals taken for this visit.   GEN: A&Ox3, " NAD, comfortable    IMPRESSION:  Pavithra Aguirre is a 54 year old adult being followed for an unusual appearing 3 cm incidentally identified pancreatic tail cyst. This has internal filling defects which by EUS appeared to be additional smaller cysts and by MRI are read as debris. Fluid analysis has generally been reassuring, although glucose level was low suggestive of some form of mucinous lesion (which is now felt more accurate that CEA).  Molecular testing is reassuring but cannot definitively prove that this does not require surveillance, and this also does not address the additional smaller cysts which may still be IPMN regardless of the larger lesion.    We discussed the new International Consensus Guidelines. These would now suggest surveillance every 6 months, however do not take the molecular testing into account.    I recommended annual testing with MRI at least until we have 5 year follow-up (2027). Then we can readdress whether to survey less frequently or stop surveillance (based on interim literature).    RECOMMENDATIONS:  MRI abdomen with contrast in 1 yr. Clinic visit with me 1 week after to allow official read.    It was a pleasure to participate in the care of this patient; please contact us with any further questions.  A total of 20 minutes was spent on the day of the visit, >50% of which was counseling regarding the above delineated issues. The remainder was review of records and imaging as well as documentation and coordination of care.    YARON Fleming MD  Professor of Medicine  Division of Gastroenterology, Hepatology and Nutrition  [facility]  8/19/2024

## 2024-08-19 NOTE — NURSING NOTE
Current patient location: [address] [place], [place] [address]E [facility] [address]    Is the patient currently in the state of [place]? YES    Visit mode:VIDEO    If the visit is dropped, the patient can be reconnected by: VIDEO VISIT: Text to cell phone:   Telephone Information:   Mobile [phone]       Will anyone else be joining the visit? NO  (If patient encounters technical issues they should call [phone]    How would you like to obtain your AVS? MyChart    Are changes needed to the allergy or medication list? Pt stated no changes to allergies and Pt stated no med changes    Are refills needed on medications prescribed by this physician? NO    Rooming Documentation:  Questionnaire(s) completed      Reason for visit: RECHECK    Amee CORNEJO

## 2024-08-19 NOTE — LETTER
"8/19/2024      Pavithra Aguirre  [address]  [facility] [address]      Dear Colleague,    Thank you for referring your patient, Pavithra Aguirre, to the [facility]. Please see a copy of my visit note below.    Virtual Visit Details    Type of service:  Video Visit     Originating Location (pt. Location): Home    D[place] Location (provider location):  Off-site  [place] [facility] [place]GR[place]  Pavithra Aguirre [id number]     SUBJECTIVE:  53 yo female being seen for pancreatic cyst surveillance. See clinic note from 4/24/23 for details.  The largest cyst was initially seen on CT chest for lung cancer screening 11/11/22. She has had subsequent MRIs and EUS for surveillance, with the most recent EUS 8/1/23 showing a 26 x 19 mm cyst with multiple internal structures suggestive of internal cysts. Fluid analysis on two occasions has returned low CEA (2.6 and then < 0.2) with elevated amylase levels. Cytology has been benign/acellular. The most recent fluid analysis was sent also for glucose and PancraGen testing.   The glucose was low suggestive of the presence of mucin. The DNA analsysis showed no kRas or GNAS mutations and no DEB of tumor suppressor genes. This was interpreted by the send out lab as \"benign\" with a low risk of malignant behavior in the next 3 years.    Of note, she has been seen by surgical oncology (Dr. Daley, 5/2/23) who recommended conservative management unless there was more evidence than than size that this was a high-risk lesion.    She is feeling well and denies abdominal pain, diarrhea, weight loss or jaundice.    MRI prior to this visit was read as:  IMPRESSION:   1. Stable multicystic lesion with layering debris in the pancreatic  tail. No worrisome features. No significant change since 12/27/2022.  2. No significant change in 5 mm nonenhancing cystic foci in the  pancreatic head, likely small branch " IPMN.  3. Additional findings similar to prior.    This was measured as 3.0 x 2.4 cm.  Again, prior MRI and EUS had identified additional tiny cysts in the tail.           OBJECTIVE:  VS: There were no vitals taken for this visit.   GEN: A&Ox3, NAD, comfortable    IMPRESSION:  Pavithra Aguirre is a 54 year old adult being followed for an unusual appearing 3 cm incidentally identified pancreatic tail cyst. This has internal filling defects which by EUS appeared to be additional smaller cysts and by MRI are read as debris. Fluid analysis has generally been reassuring, although glucose level was low suggestive of some form of mucinous lesion (which is now felt more accurate that CEA).  Molecular testing is reassuring but cannot definitively prove that this does not require surveillance, and this also does not address the additional smaller cysts which may still be IPMN regardless of the larger lesion.    We discussed the new International Consensus Guidelines. These would now suggest surveillance every 6 months, however do not take the molecular testing into account.    I recommended annual testing with MRI at least until we have 5 year follow-up (2027). Then we can readdress whether to survey less frequently or stop surveillance (based on interim literature).    RECOMMENDATIONS:  MRI abdomen with contrast in 1 yr. Clinic visit with me 1 week after to allow official read.    It was a pleasure to participate in the care of this patient; please contact us with any further questions.  A total of 20 minutes was spent on the day of the visit, >50% of which was counseling regarding the above delineated issues. The remainder was review of records and imaging as well as documentation and coordination of care.    YARON Fleming MD  Professor of Medicine  Division of Gastroenterology, Hepatology and Nutrition  [facility]  8/19/2024       Again, thank you for allowing me to participate in the care of your patient.         Sincerely,        Thomas Fleming MD

## 2024-08-19 NOTE — TELEPHONE ENCOUNTER
Briseyda:    Please arrange for repeat MRI abdomen with contrast in 1 yr. Ind - follow-up pancreatic cysts.    Clinic visit with me 1 week after to allow official read. Can be virtual. Not with JALEN.    YARON Fleming MD  Professor of Medicine  Division of Gastroenterology, Hepatology and Nutrition  [facility]

## 2024-08-20 NOTE — PATIENT INSTRUCTIONS
It was a pleasure meeting with you today and discussing your healthcare plan. Below is a summary of what we covered:    RECOMMENDATIONS:  MRI abdomen with contrast in 1 yr. Clinic visit with me 1 week after to allow official read.    Please see below for any additional questions and scheduling guidelines.    Sign up for AttorneyFee: Attorney[employer]ee patient portal serves as a secure platform for accessing your medical records from the [facility]. Additionally, Attorney[employer]ee facilitates easy, timely, and secure messaging with your care team. If you have not signed up, you may do so by using the provided code or calling [phone].    Coordinating your care after your visit:  There are multiple options for scheduling your follow-up care based on your provider's recommendation.    How do I schedule a follow-up clinic appointment:   After your appointment, you may receive scheduling assistance with the Clinic Coordinators by having a seat in the waiting room and a Clinic Coordinator will call you up to schedule.  Virtual visits or after you leave the clinic:  Your provider has placed a follow-up order in the [employer] portal for scheduling your return appointment. A member of the scheduling team will contact you to schedule.  [facility] Scheduling: Timely scheduling through [employer] is advised to ensure appointment availability.   Call to schedule: You may schedule your follow-up appointment(s) by calling [phone], option 1.    How do I schedule my endoscopy or colonoscopy procedure:  If a procedure, such as a colonoscopy or upper endoscopy was ordered by your provider, the scheduling team will contact you to schedule this procedure. Or you may choose to call to schedule at   [phone], option 2.  Please allow 20-30 minutes when scheduling a procedure.    How do I get my blood work done? To get your blood work done, you need to schedule a lab appointment at an [facility]. There are multiple ways to  schedule:   At the clinic: The Clinic Coordinator you meet after your visit can help you schedule a lab appointment.   [facility] scheduling: [facility] offers online lab scheduling at all [facility] laboratory locations.   Call to schedule: You can call [phone] to schedule your lab appointment.    How do I schedule my imaging study: To schedule imaging studies, such as CT scans, ultrasounds, MRIs, or X-rays, contact Imaging Services at [phone].    How do I schedule a referral to another doctor: If your provider recommended a referral to another specialist(s), the referral order was placed by your provider. You will receive a phone call to schedule this referral, or you may choose to call the number attached to the referral to self-schedule.    For Post-Visit Question(s):  For any inquiries following today's visit:  Please utilize [employer] messaging and allow 48 hours for reply or contact the Call Center during normal business hours at [phone], option 3.  For Emergent After-hours questions, contact the On-Call GI Fellow through the [facility]  at [phone].  In addition, you may contact your Nurse directly using the provided contact information.    Test Results:  Test results will be accessible via [employer] in compliance with the 21st Century Cures Act. This means that your results will be available to you at the same time as your provider. Often you may see your results before your provider does. Results are reviewed by staff within two weeks with communication follow-up. Results may be released in the patient portal prior to your care team review.    Prescription Refill(s):  Medication prescribed by your provider will be addressed during your visit. For future refills, please coordinate with your pharmacy. If you have not had a recent clinic visit or routine labs, for your safety, your provider may not be able to refill your prescription.        Contacts post-consultation  depending on your need:     Schedule Clinic Appointments                        [phone], option 1    Briseyda Hopper, RN Care Coordinator           [phone]     Prabhu Lovell [place]                           [phone]     GI Procedure Scheduling                               [phone], option 2     For urgent/emergent questions after business hours, you may reach the on-call GI Fellow by contacting the [facility]  at [phone].

## 2024-11-15 ENCOUNTER — MYC MEDICAL ADVICE (OUTPATIENT)
Dept: FAMILY MEDICINE | Facility: CLINIC | Age: 54
End: 2024-11-15
Payer: COMMERCIAL

## 2024-11-15 DIAGNOSIS — Z87.891 PERSONAL HISTORY OF TOBACCO USE: Primary | ICD-10-CM

## 2024-11-15 NOTE — TELEPHONE ENCOUNTER
Dr. Forrest-please review and sign if agree.    Thank you!  ELIZA Herron BSN, RN-BC  [facility]

## 2024-11-21 DIAGNOSIS — K86.2 PANCREAS CYST: Primary | ICD-10-CM

## 2025-01-04 ENCOUNTER — ANCILLARY PROCEDURE (OUTPATIENT)
Dept: CT IMAGING | Facility: CLINIC | Age: 55
End: 2025-01-04
Attending: FAMILY MEDICINE
Payer: COMMERCIAL

## 2025-01-04 DIAGNOSIS — Z87.891 PERSONAL HISTORY OF TOBACCO USE: ICD-10-CM

## 2025-01-04 PROCEDURE — 71271 CT THORAX LUNG CANCER SCR C-: CPT | Performed by: RADIOLOGY

## 2025-01-06 PROBLEM — R91.8 PULMONARY NODULES: Status: ACTIVE | Noted: 2025-01-06

## 2025-03-03 NOTE — PROGRESS NOTES
Pt arrives from her job with cc of right sided chest pain under the breast. She states it was sudden onset while working. She does heavy lifting at her job but does not recall a specific injury tonight. Pain worsens with movement. No other assoc symptoms. No chronic medical hx   Virtual Visit Details    Type of service:  Video Visit     Originating Location (pt. Location): 8:44  Distant Location (provider location):  9:01  Platform used for Video Visit: Vamshi     [facility]  GASTROENTEROLOGY PROGRESS NOTE  Pavithra Aguirre [id number]     SUBJECTIVE:  54 yo female being seen for pancreatic cyst surveillance.   Initially found incidentally on CT chest (for cancer screening) 11/11/22.     Subsequent MRI 12/27/22 showed the lesion to be cystic.    IMPRESSION: 3.5 cm multicystic pancreatic tail mass with thin  enhancing septations. Differential diagnosis include mucinous cystic  neoplasm, complex IPMN, serous cystadenoma. Recommend strict follow-up  with gastroenterology for consideration of EUS/biopsy.  1.  Additional tiny cysts in the pancreatic tail measuring up to 2 mm;  favor side branch IPMNs. Recommend continued attention on follow-up.  3. Subcentimeter lesions in hepatic segments 7 and 6 are most  suggestive of hemangiomas.   4. Large gallstones measuring up to 4.3 cm.  5. 2.3 cm left adrenal gland nodule consistent with a benign lipid  rich adenoma.  6. Moderate hepatic steatosis.  7. Peripancreatic lymph nodes are likely reactive.  8. Pancreatic divisum.    She was referred for further evaluation. Due to the large size, EUS was recommended and this was performed 1/10/23. This showed:  Impression:            - A cystic lesion with one large cyst with numerous                          smaller internal cysts was identified in the                          pancreatic tail. Fine needle aspiration of the largest                          compartment for fluid performed and sent for                          pancreatic protocol analysis.                          - Pancreas divisum was visualized, otherwise,                          pancreatic parenchyma appeared normal.                          - Gallbladder contained large stone and sludge, no                          pericholecystic fluid  "collection                          - Endosonographic images of the left adrenal gland                          were unremarkable.                          - The mediastinum was unremarkable endosonographically.                          - LA Grade A esophagitis with no bleeding.                          - Gastroesophageal flap valve classified as Hill Grade                          III (minimal fold, loose to endoscope, hiatal hernia                          likely).                          - Normal stomach.                          The EUS appearance of the cyst is unusual with one                          large dominant cavity and internal smaller \"daughter                          cysts\". No cysts are seen in other regions of the                          pancreas. No solid filling defects or debris was seen                          in the cyst. DIfferential diagnosis includes mucinous                          cystic neoplasm or perhaps serous cystadenoma.                          The small 2-3 mm tail cysts mentioned on MRI were not                          seen by this EUS.     Cytology was negative, amylase in the fluid was >7500 and fluid CEA was low at 2.6.    She developed pancreatitis the evening of the EUS and was seen in the ED. I was not contacted at that time, however she was felt to be stable and was not admitted. CT showed mild stranding in the region of the cyst. Lipase was elevated at 1400.  She was not tachycardic and BUN and WBC were normal.    I recommended MRI in 3 months. This was performed 4/10/23 and we are meeting to discuss this and further plans. MRI showed:  IMPRESSION:  1. No significant change in the multicystic debris-filled lesion  arising from the anterior pancreatic tail. The previously noted  enhancing components are not well visualized.  2. Less conspicuous additional subcentimeter cystic foci along the  main pancreatic duct, likely small side-branch IPMNs.  3. " Cholelithiasis.    Hepatic steatosis was again mentioned in the report. Of note, LFTs were normal 1/10/23.    She is feeling well. The pancreatitis pain lasted 3 days. She occasionally reports mild discomfort in the left abdomen after fatty foods. This has resolved with dietary restrictions. She has rare loose stools but no persistent diarrhea. She has lost weight subjectively but not weighed herself. This has been intentional in response to changes in her diet and increased exercise.    She has never had pancreatitis other than the post-EUS episode. She does not drink alcohol now and has no history of heavy ingestion. She quit smoking in 2018 but started at age 16.   There is no FH of pancreatic cancer. Her father had chronic pancreatitis and drank heavily.    OBJECTIVE:  VS: There were no vitals taken for this visit.   GEN: A&Ox3, NAD, comfortable  Anicteric, no jaundice.    IMPRESSION:  Pavithra Aguirre is a 53 year old adult seen for surveillance of incidentally-identified pancreatic cysts (a cluster of cysts w larger dominant cyst).      1) Pancreatic cystic lesion:  The etiology is unclear. The low CEA would argue against IPMN or MCN and the elevated amylase would argue against serous cystadenoma and would be unlikely in MCN.    The imaging and chemical analysis could be consistent with pseudocyst, however she has no history of acute pancreatitis and neither the EUS nor MRI suggested chronic pancreatitis.    Given that the cyst measured > 3 cm and did not resolve after aspiration, at this point surgical consultation is recommended per international consensus guidelines. This was discussed with the pt. In my experience, our surgeons will likely recommend surveillance given the lack of high-risk features other than size, especially in light of the low fluid CEA. As such, I will tentatively schedule a 3 month follow-up MRI.    Given the prior post-EUS pancreatitis, we discussed that I will avoid repeat sampling  unless there are significant changes. Moray biopsy could otherwise be considered but will again risk pancreatitis and is relatively low yield in my experience.    2) Steatosis on imaging. LFTs normal. Denies alcohol. No further evaluation recommended.    3) Gallbladder stone. Discussed that no furher evaluation or management is indicated in the absence of symptoms.     RECOMMENDATIONS:  See above.    It was a pleasure to participate in the care of this patient; please contact us with any further questions.  A total of 39 minutes was spent on the day of the visit, >50% of which was counseling regarding the above delineated issues. The remainder was review of records and imaging as well as documentation and coordination of care.    YARON Fleming MD  Professor of Medicine  Division of Gastroenterology, Hepatology and Nutrition  [facility]  4/24/2023

## 2025-03-09 ENCOUNTER — HEALTH MAINTENANCE LETTER (OUTPATIENT)
Age: 55
End: 2025-03-09

## 2025-04-14 DIAGNOSIS — F33.42 RECURRENT MAJOR DEPRESSIVE DISORDER, IN FULL REMISSION: ICD-10-CM

## 2025-04-14 RX ORDER — BUPROPION HYDROCHLORIDE 100 MG/1
100 TABLET, EXTENDED RELEASE ORAL 2 TIMES DAILY
Qty: 180 TABLET | Refills: 1 | Status: SHIPPED | OUTPATIENT
Start: 2025-04-14

## 2025-04-14 NOTE — TELEPHONE ENCOUNTER
Patient due for appointment for yearly preventative exam.  I sent stephanie refill x 180 days to cover until appointment can be made.  Reception: Please call to schedule patient for appointment.  Thank you!  SJ

## 2025-06-02 DIAGNOSIS — I10 HYPERTENSION GOAL BP (BLOOD PRESSURE) < 130/80: ICD-10-CM

## 2025-06-02 RX ORDER — LOSARTAN POTASSIUM AND HYDROCHLOROTHIAZIDE 12.5; 5 MG/1; MG/1
1 TABLET ORAL DAILY
Qty: 30 TABLET | Refills: 0 | Status: SHIPPED | OUTPATIENT
Start: 2025-06-02

## 2025-06-16 SDOH — HEALTH STABILITY: PHYSICAL HEALTH: ON AVERAGE, HOW MANY DAYS PER WEEK DO YOU ENGAGE IN MODERATE TO STRENUOUS EXERCISE (LIKE A BRISK WALK)?: 2 DAYS

## 2025-06-16 SDOH — HEALTH STABILITY: PHYSICAL HEALTH: ON AVERAGE, HOW MANY MINUTES DO YOU ENGAGE IN EXERCISE AT THIS LEVEL?: 20 MIN

## 2025-06-16 ASSESSMENT — SOCIAL DETERMINANTS OF HEALTH (SDOH): HOW OFTEN DO YOU GET TOGETHER WITH FRIENDS OR RELATIVES?: MORE THAN THREE TIMES A WEEK

## 2025-06-18 ASSESSMENT — PATIENT HEALTH QUESTIONNAIRE - PHQ9
SUM OF ALL RESPONSES TO PHQ QUESTIONS 1-9: 2
SUM OF ALL RESPONSES TO PHQ QUESTIONS 1-9: 2
10. IF YOU CHECKED OFF ANY PROBLEMS, HOW DIFFICULT HAVE THESE PROBLEMS MADE IT FOR YOU TO DO YOUR WORK, TAKE CARE OF THINGS AT HOME, OR GET ALONG WITH OTHER PEOPLE: SOMEWHAT DIFFICULT

## 2025-06-19 ENCOUNTER — OFFICE VISIT (OUTPATIENT)
Dept: FAMILY MEDICINE | Facility: CLINIC | Age: 55
End: 2025-06-19
Payer: COMMERCIAL

## 2025-06-19 VITALS
DIASTOLIC BLOOD PRESSURE: 73 MMHG | HEART RATE: 75 BPM | SYSTOLIC BLOOD PRESSURE: 106 MMHG | BODY MASS INDEX: 34.11 KG/M2 | TEMPERATURE: 96.9 F | OXYGEN SATURATION: 99 % | HEIGHT: 69 IN | RESPIRATION RATE: 20 BRPM | WEIGHT: 230.3 LBS

## 2025-06-19 DIAGNOSIS — Z00.00 ROUTINE GENERAL MEDICAL EXAMINATION AT A HEALTH CARE FACILITY: Primary | ICD-10-CM

## 2025-06-19 DIAGNOSIS — R73.03 PREDIABETES: ICD-10-CM

## 2025-06-19 DIAGNOSIS — Z12.4 CERVICAL CANCER SCREENING: ICD-10-CM

## 2025-06-19 DIAGNOSIS — K57.30 DIVERTICULOSIS OF LARGE INTESTINE WITHOUT HEMORRHAGE: ICD-10-CM

## 2025-06-19 DIAGNOSIS — K86.89 PANCREATIC MASS: ICD-10-CM

## 2025-06-19 DIAGNOSIS — Z12.11 SCREEN FOR COLON CANCER: ICD-10-CM

## 2025-06-19 DIAGNOSIS — F33.42 RECURRENT MAJOR DEPRESSIVE DISORDER, IN FULL REMISSION: ICD-10-CM

## 2025-06-19 DIAGNOSIS — I10 HYPERTENSION GOAL BP (BLOOD PRESSURE) < 130/80: ICD-10-CM

## 2025-06-19 PROBLEM — R91.8 PULMONARY NODULES: Status: RESOLVED | Noted: 2025-01-06 | Resolved: 2025-06-19

## 2025-06-19 LAB
ANION GAP SERPL CALCULATED.3IONS-SCNC: 10 MMOL/L (ref 7–15)
BUN SERPL-MCNC: 12 MG/DL (ref 6–20)
CALCIUM SERPL-MCNC: 9.9 MG/DL (ref 8.8–10.4)
CHLORIDE SERPL-SCNC: 99 MMOL/L (ref 98–107)
CHOLEST SERPL-MCNC: 199 MG/DL
CREAT SERPL-MCNC: 0.87 MG/DL (ref 0.51–1.17)
EGFRCR SERPLBLD CKD-EPI 2021: 78 ML/MIN/1.73M2
EST. AVERAGE GLUCOSE BLD GHB EST-MCNC: 108 MG/DL
FASTING STATUS PATIENT QL REPORTED: NO
FASTING STATUS PATIENT QL REPORTED: NO
GLUCOSE SERPL-MCNC: 88 MG/DL (ref 70–99)
HBA1C MFR BLD: 5.4 % (ref 0–5.6)
HCO3 SERPL-SCNC: 27 MMOL/L (ref 22–29)
HDLC SERPL-MCNC: 44 MG/DL
LDLC SERPL CALC-MCNC: 114 MG/DL
NONHDLC SERPL-MCNC: 155 MG/DL
POTASSIUM SERPL-SCNC: 4.1 MMOL/L (ref 3.4–5.3)
SODIUM SERPL-SCNC: 136 MMOL/L (ref 135–145)
TRIGL SERPL-MCNC: 207 MG/DL

## 2025-06-19 RX ORDER — LOSARTAN POTASSIUM AND HYDROCHLOROTHIAZIDE 12.5; 5 MG/1; MG/1
1 TABLET ORAL DAILY
Qty: 90 TABLET | Refills: 3 | Status: CANCELLED | OUTPATIENT
Start: 2025-06-19

## 2025-06-19 RX ORDER — BUPROPION HYDROCHLORIDE 100 MG/1
100 TABLET, EXTENDED RELEASE ORAL 2 TIMES DAILY
Qty: 180 TABLET | Refills: 3 | Status: SHIPPED | OUTPATIENT
Start: 2025-06-19

## 2025-06-19 ASSESSMENT — PAIN SCALES - GENERAL: PAINLEVEL_OUTOF10: NO PAIN (0)

## 2025-06-19 NOTE — PATIENT INSTRUCTIONS
Patient Education   Preventive Care Advice   This is general advice given by our system to help you stay healthy. However, your care team may have specific advice just for you. Please talk to your care team about your preventive care needs.  Nutrition  Eat 5 or more servings of fruits and vegetables each day.  Try wheat bread, brown rice and whole grain pasta (instead of white bread, rice, and pasta).  Get enough calcium and vitamin D. Check the label on foods and aim for 100% of the RDA (recommended daily allowance).  Lifestyle  Exercise at least 150 minutes each week  (30 minutes a day, 5 days a week).  Do muscle strengthening activities 2 days a week. These help control your weight and prevent disease.  No smoking.  Wear sunscreen to prevent skin cancer.  Have a dental exam and cleaning every 6 months.  Yearly exams  See your health care team every year to talk about:  Any changes in your health.  Any medicines your care team has prescribed.  Preventive care, family planning, and ways to prevent chronic diseases.  Shots (vaccines)   HPV shots (up to age 26), if you've never had them before.  Hepatitis B shots (up to age 59), if you've never had them before.  COVID-19 shot: Get this shot when it's due.  Flu shot: Get a flu shot every year.  Tetanus shot: Get a tetanus shot every 10 years.  Pneumococcal, hepatitis A, and RSV shots: Ask your care team if you need these based on your risk.  Shingles shot (for age 50 and up)  General health tests  Diabetes screening:  Starting at age 35, Get screened for diabetes at least every 3 years.  If you are younger than age 35, ask your care team if you should be screened for diabetes.  Cholesterol test: At age 39, start having a cholesterol test every 5 years, or more often if advised.  Bone density scan (DEXA): At age 50, ask your care team if you should have this scan for osteoporosis (brittle bones).  Hepatitis C: Get tested at least once in your life.  STIs (sexually  transmitted infections)  Before age 24: Ask your care team if you should be screened for STIs.  After age 24: Get screened for STIs if you're at risk. You are at risk for STIs (including HIV) if:  You are sexually active with more than one person.  You don't use condoms every time.  You or a partner was diagnosed with a sexually transmitted infection.  If you are at risk for HIV, ask about PrEP medicine to prevent HIV.  Get tested for HIV at least once in your life, whether you are at risk for HIV or not.  Cancer screening tests  Cervical cancer screening: If you have a cervix, begin getting regular cervical cancer screening tests starting at age 21.  Breast cancer scan (mammogram): If you've ever had breasts, begin having regular mammograms starting at age 40. This is a scan to check for breast cancer.  Colon cancer screening: It is important to start screening for colon cancer at age 45.  Have a colonoscopy test every 10 years (or more often if you're at risk) Or, ask your provider about stool tests like a FIT test every year or Cologuard test every 3 years.  To learn more about your testing options, visit:   .  For help making a decision, visit:   https://bit.ly/en12664.  Prostate cancer screening test: If you have a prostate, ask your care team if a prostate cancer screening test (PSA) at age 55 is right for you.  Lung cancer screening: If you are a current or former smoker ages 50 to 80, ask your care team if ongoing lung cancer screenings are right for you.  For informational purposes only. Not to replace the advice of your health care provider. Copyright   2023 [facility]. All rights reserved. Clinically reviewed by the [facility]. Bindo 250716 - REV 01/24.  Learning About Stress  What is stress?     Stress is your body's response to a hard situation. Your body can have a physical, emotional, or mental response. Stress is a fact of life for most people, and it  affects everyone differently. What causes stress for you may not be stressful for someone else.  A lot of things can cause stress. You may feel stress when you go on a job interview, take a test, or run a race. This kind of short-term stress is normal and even useful. It can help you if you need to work hard or react quickly. For example, stress can help you finish an important job on time.  Long-term stress is caused by ongoing stressful situations or events. Examples of long-term stress include long-term health problems, ongoing problems at work, or conflicts in your family. Long-term stress can harm your health.  How does stress affect your health?  When you are stressed, your body responds as though you are in danger. It makes hormones that speed up your heart, make you breathe faster, and give you a burst of energy. This is called the fight-or-flight stress response. If the stress is over quickly, your body goes back to normal and no harm is done.  But if stress happens too often or lasts too long, it can have bad effects. Long-term stress can make you more likely to get sick, and it can make symptoms of some diseases worse. If you tense up when you are stressed, you may develop neck, shoulder, or low back pain. Stress is linked to high blood pressure and heart disease.  Stress also harms your emotional health. It can make you merritt, tense, or depressed. Your relationships may suffer, and you may not do well at work or school.  What can you do to manage stress?  You can try these things to help manage stress:   Do something active. Exercise or activity can help reduce stress. Walking is a great way to get started. Even everyday activities such as housecleaning or yard work can help.  Try yoga or marco chi. These techniques combine exercise and meditation. You may need some training at first to learn them.  Do something you enjoy. For example, listen to music or go to a movie. Practice your hobby or do volunteer  "work.  Meditate. This can help you relax, because you are not worrying about what happened before or what may happen in the future.  Do guided imagery. Imagine yourself in any setting that helps you feel calm. You can use online videos, books, or a teacher to guide you.  Do breathing exercises. For example:  From a standing position, bend forward from the waist with your knees slightly bent. Let your arms dangle close to the floor.  Breathe in slowly and deeply as you return to a standing position. Roll up slowly and lift your head last.  Hold your breath for just a few seconds in the standing position.  Breathe out slowly and bend forward from the waist.  Let your feelings out. Talk, laugh, cry, and express anger when you need to. Talking with supportive friends or family, a counselor, or a syed leader about your feelings is a healthy way to relieve stress. Avoid discussing your feelings with people who make you feel worse.  Write. It may help to write about things that are bothering you. This helps you find out how much stress you feel and what is causing it. When you know this, you can find better ways to cope.  What can you do to prevent stress?  You might try some of these things to help prevent stress:  Manage your time. This helps you find time to do the things you want and need to do.  Get enough sleep. Your body recovers from the stresses of the day while you are sleeping.  Get support. Your family, friends, and community can make a difference in how you experience stress.  Limit your news feed. Avoid or limit time on social media or news that may make you feel stressed.  Do something active. Exercise or activity can help reduce stress. Walking is a great way to get started.  Where can you learn more?  Go to https://www.[employer].net/patiented  Enter N032 in the search box to learn more about \"Learning About Stress.\"  Current as of: October 24, 2024  Content Version: 14.5 2024-2025 [employer]   Care instructions adapted under license by your healthcare professional. If you have questions about a medical condition or this instruction, always ask your healthcare professional. [employer] disclaims any warranty or liability for your use of this information.

## 2025-06-19 NOTE — PROGRESS NOTES
Preventive Care Visit  [facility]  Breanna Forrest MD, Family Medicine  Jun 19, 2025      Assessment & Plan     Problem List as of 6/19/2025 Reviewed: 6/19/2025  4:08 PM by Breanna Forrest MD            Noted       High    1. Routine general medical examination at a health care facility - Primary 6/19/2025     Overview Addendum 6/19/2025  4:09 PM by Breanna Forrest MD   55 year old well known to me (since 2023) here today for check up.  Lives with sandi Whitehead - getting  in Sept!, works for ..  Preventative Plan:  Mammo: up to date, normal density but family history, due 7/2025  Pap: due, done today  Colon: up to date, Cologuard due 12/2025, patient elects colonoscopy given her diverticulitis.  Will do in December.  Lung Cancer: up to date, due 1/2026 (28 pack years, quit 2018)  Dexa: discussed, unsure family history, ordered today  Immunizations: PCV20, 3rd Hep B  Labs: problem based, see below          Relevant Orders     HPV and Gynecologic Cytology Panel - Recommended Age 30 - 65 Years     Lipid panel reflex to direct LDL Non-fasting     DX Bone Density       Medium    2. Depression with anxiety(H24) 1/18/2016     Overview Addendum 6/19/2025  3:06 PM by Breanna Forrest MD   06/19/2025 A/P:  Stable and well controlled on sertraline 50 mg and bupropion (Wellbutrin) 100 mg BID.  Refilled.        11/3/2022    12:37 PM 2/8/2024     3:53 PM 6/18/2025     4:57 PM   PHQ   PHQ-9 Total Score 7 7 2    Q9: Thoughts of better off dead/self-harm past 2 weeks Several days Not at all Not at all       Patient-reported               Relevant Medications     sertraline (ZOLOFT) 50 MG tablet     buPROPion (WELLBUTRIN SR) 100 MG 12 hr tablet    3. Diverticulosis of large intestine without hemorrhage 1/9/2023     Overview Addendum 6/19/2025  3:21 PM by Breanna Forrest MD   06/19/2025 A/P: had flare up few weeks ago.  Plan: hydrate, add psyllium and high fiber diet.   2023: episode  "of likely diverticulitis  2020: Noted on colonoscopy 10/22/2020         4. Hypertension goal BP (blood pressure) < 130/80 2/12/2024     Overview Addendum 6/19/2025  4:08 PM by Breanna Forrest MD   06/19/2025 A/P:  Has lost ~30 lbs; blood pressure now very low on losartan 50 mg and hydrochlorothiazide 12.5 mg.  Will trial stopping medication and see what blood pressure is at home  Sent [employer] message to follow up next week with home readings off medication.          Relevant Orders     BASIC METABOLIC PANEL    5. Pancreatic mass 1/9/2023     Overview Addendum 6/19/2025  3:12 PM by Breanna Forrest MD   06/19/2025 A/P: Seeing GI who is following.  Latest recommendation is MRI annually x 5 years (until 2027).  Up to date and due in 9/2025.    History:  2024: Follow up would be MRI/clinic in one year (9/2023)  7/2023: repeat scan and EUS in July.  If has grown will recommend surgery.  Followed by Dr. Lonnie BISHOP  1/2023: Incidental finding on screening CT chest for lung cancer screening. EUS on 1/10/23.           6. Prediabetes 2/12/2024     Overview Addendum 6/19/2025  3:07 PM by Breanna Forrest MD   06/19/2025 A/P:  Check A1C today.  Discussed CGM, metformin.    2024: new dx, A1C 5.9%.  Referred [facility]          Relevant Orders     Hemoglobin A1c (Completed)         BMI  Estimated body mass index is 33.72 kg/m  as calculated from the following:    Height as of this encounter: 1.76 m (5' 9.29\").    Weight as of this encounter: 104.5 kg (230 lb 4.8 oz).   Weight management plan: Discussed healthy diet and exercise guidelines    Counseling  Appropriate preventive services were addressed with this patient via screening, questionnaire, or discussion as appropriate for fall prevention, nutrition, physical activity, Tobacco-use cessation, social engagement, weight loss and cognition.  Checklist reviewing preventive services available has been given to the patient.  Reviewed patient's diet, addressing concerns " "and/or questions.   Marisa is at risk for lack of exercise and has been provided with information to increase physical activity for the benefit of Marisa's well-being.   aMrisa is at risk for psychosocial distress and has been provided with information to reduce risk.       Subjective   Marisa is a 55 year old, presenting for the following:  Physical (With PAP) and Patient Request (Would like to discuss Diverticulitis )       HPI  Marisa Aguirre, 55 years    Diverticulitis  - Experienced a flare-up of diverticulitis approximately 2.5 weeks ago.  - Flare-up occurred after a recent episode of pancreatitis.  - Initially did not take the condition seriously due to other ongoing issues.  - As a vegetarian, believed to be consuming a high-fiber diet but was not tracking fiber intake or hydration.  - Consumed a lot of popcorn and nuts, which may have contributed to the flare-up.  - Managed the flare-up by stopping solid food intake and switching to a liquid diet for a couple of weeks.  - Currently eating low-fiber foods like applesauce and mashed potatoes.  - Reports occasional bloating after resuming solid foods, which improves with increased water intake and walking after meals.  - Has not tried psyllium supplements but is open to starting them.    Hydration  - Historically under-hydrated due to being a coffee drinker.  - Actively working on increasing water intake, including drinking a mug of warm water first thing in the morning.    Weight Loss  - Has been trying to lose weight and has successfully lost a significant amount.  - Acknowledges the importance of maintaining metabolic health.    Family History  - Family history of breast cancer on father's side (father's sister and mother).    Bone Health  - Reports a decrease in height from 5'10.5\" to 5'9\" or 5'9.5\", indicating possible bone density changes.    Social History  - Vegetarian diet.  - Engaged in weight loss efforts.    Advance Care Planning    Discussed advance care " planning with patient; informed AVS has link to [facility].        6/16/2025   General Health   How would you rate your overall physical health? (!) FAIR   Feel stress (tense, anxious, or unable to sleep) To some extent   (!) STRESS CONCERN      6/16/2025   Nutrition   Three or more servings of calcium each day? Yes   Diet: Low salt    Low fat/cholesterol    Vegetarian/vegan   How many servings of fruit and vegetables per day? 4 or more   How many sweetened beverages each day? 0-1       Multiple values from one day are sorted in reverse-chronological order         6/16/2025   Exercise   Days per week of moderate/strenous exercise 2 days   Average minutes spent exercising at this level 20 min   (!) EXERCISE CONCERN      6/16/2025   Social Factors   Frequency of gathering with friends or relatives More than three times a week   Worry food won't last until get money to buy more No   Food not last or not have enough money for food? No   Do you have housing? (Housing is defined as stable permanent housing and does not include staying outside in a car, in a tent, in an abandoned building, in an overnight shelter, or couch-surfing.) Yes   Are you worried about losing your housing? No   Lack of transportation? Patient declined   Unable to get utilities (heat,electricity)? No         6/16/2025   Fall Risk   Fallen 2 or more times in the past year? No   Trouble with walking or balance? No          6/16/2025   Dental   Dentist two times every year? (!) DECLINE       Today's PHQ-9 Score:       6/18/2025     4:57 PM   PHQ-9 SCORE   PHQ-9 Total Score MyChart 2 (Minimal depression)   PHQ-9 Total Score 2        Patient-reported         6/16/2025   Substance Use   Alcohol more than 3/day or more than 7/wk Not Applicable   Do you use any other substances recreationally? (!) CANNABIS PRODUCTS     Social History     Tobacco Use    Smoking status: Former     Current packs/day: 0.00     Average packs/day: 0.3 packs/day for 44.0  "years (14.0 ttl pk-yrs)     Types: Cigarettes     Start date: 1986     Quit date: 2018     Years since quittin.4     Passive exposure: Past    Smokeless tobacco: Never   Vaping Use    Vaping status: Some Days    Substances: THC   Substance Use Topics    Alcohol use: Not Currently     Comment: ocassional    Drug use: Yes     Types: Marijuana     Comment: every once in awhile marijuana use.           2024   LAST FHS-7 RESULTS   Any relative bilateral breast cancer No   Any male have breast cancer No   Any ONE woman have BOTH breast AND ovarian cancer No   2 or more relatives with breast AND/OR bowel cancer No                2025   STI Screening   New sexual partner(s) since last STI/HIV test? No     History of abnormal Pap smear:         Latest Ref Rng & Units 2020     9:01 AM 2020     9:00 AM 2015    12:00 AM   PAP / HPV   PAP (Historical)  NIL   NIL    HPV 16 DNA NEG^Negative  Negative     HPV 18 DNA NEG^Negative  Negative     Other HR HPV NEG^Negative  Negative       ASCVD Risk   The 10-year ASCVD risk score (Elizabeth CRESPO, et al., 2019) is: 2.7%    Values used to calculate the score:      Age: 55 years      Sex: Female      Is Non- : No      Diabetic: No      Tobacco smoker: No      Systolic Blood Pressure: 106 mmHg      Is BP treated: Yes      HDL Cholesterol: 48 mg/dL      Total Cholesterol: 251 mg/dL           Reviewed and updated as needed this visit by Provider   Tobacco  Allergies  Meds  Problems  Med Hx  Surg Hx  Fam Hx                 Objective    Exam  /73 (BP Location: Right arm, Patient Position: Sitting, Cuff Size: Adult Large)   Pulse 75   Temp 96.9  F (36.1  C) (Temporal)   Resp 20   Ht 1.76 m (' 9.\")   Wt 104.5 kg (230 lb 4.8 oz)   SpO2 99%   BMI 33.72 kg/m     Estimated body mass index is 33.72 kg/m  as calculated from the following:    Height as of this encounter: 1.76 m (5' 9.29\").    Weight as of this " encounter: 104.5 kg (230 lb 4.8 oz).    Physical Exam  GENERAL: alert and no distress  EYES: Eyes grossly normal to inspection, PERRL and conjunctivae and sclerae normal  HENT: ear canals and TM's normal, nose and mouth without ulcers or lesions  NECK: no adenopathy, no asymmetry, masses, or scars  RESP: lungs clear to auscultation - no rales, rhonchi or wheezes  CV: regular rate and rhythm, normal S1 S2, no S3 or S4, no murmur, click or rub, no peripheral edema  ABDOMEN: soft, nontender, no hepatosplenomegaly, no masses and bowel sounds normal   (female): normal female external genitalia, normal urethral meatus, normal vaginal mucosa  MS: no gross musculoskeletal defects noted, no edema  SKIN: no suspicious lesions or rashes  NEURO: Normal strength and tone, mentation intact and speech normal  PSYCH: mentation appears normal, affect normal/bright        Signed Electronically by: Breanna Forrest MD    Answers submitted by the patient for this visit:  Patient Health Questionnaire (Submitted on 6/18/2025)  If you checked off any problems, how difficult have these problems made it for you to do your work, take care of things at home, or get along with other people?: Somewhat difficult  PHQ9 TOTAL SCORE: 2

## 2025-06-19 NOTE — NURSING NOTE
Prior to immunization administration, verified patients identity using patient s name and date of birth. Please see Immunization Activity for additional information.     Screening Questionnaire for Adult Immunization    Are you sick today?   No   Do you have allergies to medications, food, a vaccine component or latex?   Yes   Have you ever had a serious reaction after receiving a vaccination?   No   Do you have a long-term health problem with heart, lung, kidney, or metabolic disease (e.g., diabetes), asthma, a blood disorder, no spleen, complement component deficiency, a cochlear implant, or a spinal fluid leak?  Are you on long-term aspirin therapy?   Yes   Do you have cancer, leukemia, HIV/AIDS, or any other immune system problem?   No   Do you have a parent, brother, or sister with an immune system problem?   No   In the past 3 months, have you taken medications that affect  your immune system, such as prednisone, other steroids, or anticancer drugs; drugs for the treatment of rheumatoid arthritis, Crohn s disease, or psoriasis; or have you had radiation treatments?   No   Have you had a seizure, or a brain or other nervous system problem?   No   During the past year, have you received a transfusion of blood or blood    products, or been given immune (gamma) globulin or antiviral drug?   No   For women: Are you pregnant or is there a chance you could become       pregnant during the next month?   No   Have you received any vaccinations in the past 4 weeks?   No     Immunization questionnaire was positive for at least one answer.  Notified Dr. Lon MD.      Patient instructed to remain in clinic for 15 minutes afterwards, and to report any adverse reactions.     Screening performed by Karen Almonte MA on 6/19/2025 at 3:38 PM.

## 2025-06-20 ENCOUNTER — RESULTS FOLLOW-UP (OUTPATIENT)
Dept: OBGYN | Facility: CLINIC | Age: 55
End: 2025-06-20

## 2025-06-24 LAB
BKR AP ASSOCIATED HPV REPORT: NORMAL
BKR LAB AP GYN ADEQUACY: NORMAL
BKR LAB AP GYN INTERPRETATION: NORMAL
BKR LAB AP PREVIOUS ABNORMAL: NORMAL
PATH REPORT.COMMENTS IMP SPEC: NORMAL
PATH REPORT.COMMENTS IMP SPEC: NORMAL
PATH REPORT.RELEVANT HX SPEC: NORMAL

## 2025-08-14 ENCOUNTER — TELEPHONE (OUTPATIENT)
Dept: GASTROENTEROLOGY | Facility: CLINIC | Age: 55
End: 2025-08-14
Payer: COMMERCIAL

## (undated) DEVICE — TUBING SUCTION 12"X1/4" N612

## (undated) DEVICE — KIT ENDO TURNOVER/PROCEDURE CARRY-ON 101822

## (undated) DEVICE — SUCTION MANIFOLD NEPTUNE 2 SYS 1 PORT 702-025-000

## (undated) DEVICE — SOL WATER IRRIG 1000ML BOTTLE 2F7114

## (undated) DEVICE — GOWN IMPERVIOUS 2XL BLUE

## (undated) DEVICE — SPECIMEN CONTAINER 3OZ W/FORMALIN 59901

## (undated) RX ORDER — LEVOFLOXACIN 5 MG/ML
INJECTION, SOLUTION INTRAVENOUS
Status: DISPENSED
Start: 2023-01-10

## (undated) RX ORDER — FENTANYL CITRATE 50 UG/ML
INJECTION, SOLUTION INTRAMUSCULAR; INTRAVENOUS
Status: DISPENSED
Start: 2020-10-22

## (undated) RX ORDER — LEVOFLOXACIN 5 MG/ML
INJECTION, SOLUTION INTRAVENOUS
Status: DISPENSED
Start: 2023-08-01

## (undated) RX ORDER — PROPOFOL 10 MG/ML
INJECTION, EMULSION INTRAVENOUS
Status: DISPENSED
Start: 2023-01-10